# Patient Record
Sex: MALE | Race: WHITE | NOT HISPANIC OR LATINO | Employment: OTHER | URBAN - NONMETROPOLITAN AREA
[De-identification: names, ages, dates, MRNs, and addresses within clinical notes are randomized per-mention and may not be internally consistent; named-entity substitution may affect disease eponyms.]

---

## 2021-05-18 ENCOUNTER — OFFICE VISIT (OUTPATIENT)
Dept: FAMILY MEDICINE | Facility: CLINIC | Age: 86
End: 2021-05-18
Payer: MEDICARE

## 2021-05-18 VITALS
HEIGHT: 75 IN | OXYGEN SATURATION: 99 % | DIASTOLIC BLOOD PRESSURE: 80 MMHG | BODY MASS INDEX: 27.35 KG/M2 | SYSTOLIC BLOOD PRESSURE: 130 MMHG | HEART RATE: 60 BPM | RESPIRATION RATE: 18 BRPM | WEIGHT: 220 LBS | TEMPERATURE: 97 F

## 2021-05-18 DIAGNOSIS — E03.9 HYPOTHYROIDISM, UNSPECIFIED TYPE: Primary | ICD-10-CM

## 2021-05-18 DIAGNOSIS — I10 ESSENTIAL HYPERTENSION, BENIGN: ICD-10-CM

## 2021-05-18 DIAGNOSIS — E78.2 MIXED HYPERLIPIDEMIA: ICD-10-CM

## 2021-05-18 LAB
ALBUMIN SERPL BCP-MCNC: 3.9 G/DL (ref 3.5–5)
ALBUMIN/GLOB SERPL: 1.2 {RATIO}
ALP SERPL-CCNC: 70 U/L (ref 45–115)
ALT SERPL W P-5'-P-CCNC: 31 U/L (ref 16–61)
ANION GAP SERPL CALCULATED.3IONS-SCNC: 7 MMOL/L (ref 7–16)
AST SERPL W P-5'-P-CCNC: 20 U/L (ref 15–37)
BILIRUB SERPL-MCNC: 0.9 MG/DL (ref 0–1.2)
BUN SERPL-MCNC: 26 MG/DL (ref 7–18)
BUN/CREAT SERPL: 25 (ref 6–20)
CALCIUM SERPL-MCNC: 8.8 MG/DL (ref 8.5–10.1)
CHLORIDE SERPL-SCNC: 105 MMOL/L (ref 98–107)
CHOLEST SERPL-MCNC: 232 MG/DL (ref 0–200)
CHOLEST/HDLC SERPL: 5.2 {RATIO}
CO2 SERPL-SCNC: 30 MMOL/L (ref 21–32)
CREAT SERPL-MCNC: 1.03 MG/DL (ref 0.7–1.3)
GLOBULIN SER-MCNC: 3.3 G/DL (ref 2–4)
GLUCOSE SERPL-MCNC: 121 MG/DL (ref 74–106)
HDLC SERPL-MCNC: 45 MG/DL (ref 40–60)
LDLC SERPL CALC-MCNC: 166 MG/DL
LDLC/HDLC SERPL: 3.7 {RATIO}
NONHDLC SERPL-MCNC: 187 MG/DL
POTASSIUM SERPL-SCNC: 4.1 MMOL/L (ref 3.5–5.1)
PROT SERPL-MCNC: 7.2 G/DL (ref 6.4–8.2)
SODIUM SERPL-SCNC: 138 MMOL/L (ref 136–145)
TRIGL SERPL-MCNC: 105 MG/DL (ref 35–150)
TSH SERPL DL<=0.005 MIU/L-ACNC: 1.25 UIU/ML (ref 0.36–3.74)
VLDLC SERPL-MCNC: 21 MG/DL

## 2021-05-18 PROCEDURE — 80061 LIPID PANEL: CPT | Mod: ,,, | Performed by: CLINICAL MEDICAL LABORATORY

## 2021-05-18 PROCEDURE — 80061 LIPID PANEL: ICD-10-PCS | Mod: ,,, | Performed by: CLINICAL MEDICAL LABORATORY

## 2021-05-18 PROCEDURE — 80053 COMPREHEN METABOLIC PANEL: CPT | Mod: ,,, | Performed by: CLINICAL MEDICAL LABORATORY

## 2021-05-18 PROCEDURE — 84443 TSH: ICD-10-PCS | Mod: ,,, | Performed by: CLINICAL MEDICAL LABORATORY

## 2021-05-18 PROCEDURE — 99213 PR OFFICE/OUTPT VISIT, EST, LEVL III, 20-29 MIN: ICD-10-PCS | Mod: ,,, | Performed by: NURSE PRACTITIONER

## 2021-05-18 PROCEDURE — 80053 COMPREHENSIVE METABOLIC PANEL: ICD-10-PCS | Mod: ,,, | Performed by: CLINICAL MEDICAL LABORATORY

## 2021-05-18 PROCEDURE — 84443 ASSAY THYROID STIM HORMONE: CPT | Mod: ,,, | Performed by: CLINICAL MEDICAL LABORATORY

## 2021-05-18 PROCEDURE — 99213 OFFICE O/P EST LOW 20 MIN: CPT | Mod: ,,, | Performed by: NURSE PRACTITIONER

## 2021-05-18 RX ORDER — HYDROCHLOROTHIAZIDE 25 MG/1
25 TABLET ORAL DAILY
Qty: 90 TABLET | Refills: 1 | Status: SHIPPED | OUTPATIENT
Start: 2021-05-18 | End: 2021-11-30 | Stop reason: SDUPTHER

## 2021-05-18 RX ORDER — LEVOTHYROXINE SODIUM 50 UG/1
50 TABLET ORAL
Qty: 108 TABLET | Refills: 1 | Status: SHIPPED | OUTPATIENT
Start: 2021-05-18 | End: 2021-08-10 | Stop reason: SDUPTHER

## 2021-05-18 RX ORDER — LOSARTAN POTASSIUM 50 MG/1
50 TABLET ORAL DAILY
Qty: 90 TABLET | Refills: 1 | Status: SHIPPED | OUTPATIENT
Start: 2021-05-18 | End: 2021-11-30 | Stop reason: SDUPTHER

## 2021-05-18 RX ORDER — HYDROCHLOROTHIAZIDE 25 MG/1
25 TABLET ORAL DAILY
COMMUNITY
End: 2021-05-18 | Stop reason: SDUPTHER

## 2021-05-18 RX ORDER — ACETAMINOPHEN 500 MG
2000 TABLET ORAL DAILY
COMMUNITY

## 2021-05-18 RX ORDER — ASCORBIC ACID 500 MG
500 TABLET ORAL DAILY
COMMUNITY

## 2021-05-18 RX ORDER — LEVOTHYROXINE SODIUM 50 UG/1
50 TABLET ORAL
COMMUNITY
End: 2021-05-18 | Stop reason: SDUPTHER

## 2021-05-18 RX ORDER — LOSARTAN POTASSIUM 50 MG/1
50 TABLET ORAL DAILY
COMMUNITY
End: 2021-05-18

## 2021-05-18 RX ORDER — AMOXICILLIN 500 MG
1 CAPSULE ORAL DAILY
COMMUNITY

## 2021-05-18 RX ORDER — NITROGLYCERIN 0.4 MG/1
0.4 TABLET SUBLINGUAL EVERY 5 MIN PRN
COMMUNITY
End: 2022-12-28 | Stop reason: SDUPTHER

## 2021-08-10 DIAGNOSIS — E03.9 HYPOTHYROIDISM, UNSPECIFIED TYPE: ICD-10-CM

## 2021-08-10 RX ORDER — LEVOTHYROXINE SODIUM 50 UG/1
TABLET ORAL
Qty: 136 TABLET | Refills: 1 | Status: SHIPPED | OUTPATIENT
Start: 2021-08-10 | End: 2021-08-10 | Stop reason: SDUPTHER

## 2021-08-10 RX ORDER — LEVOTHYROXINE SODIUM 50 UG/1
TABLET ORAL
Qty: 136 TABLET | Refills: 1 | Status: SHIPPED | OUTPATIENT
Start: 2021-08-10 | End: 2021-11-30 | Stop reason: SDUPTHER

## 2021-11-30 ENCOUNTER — OFFICE VISIT (OUTPATIENT)
Dept: FAMILY MEDICINE | Facility: CLINIC | Age: 86
End: 2021-11-30
Payer: MEDICARE

## 2021-11-30 VITALS
DIASTOLIC BLOOD PRESSURE: 78 MMHG | HEART RATE: 72 BPM | HEIGHT: 75 IN | WEIGHT: 219.19 LBS | BODY MASS INDEX: 27.25 KG/M2 | OXYGEN SATURATION: 99 % | TEMPERATURE: 96 F | SYSTOLIC BLOOD PRESSURE: 120 MMHG | RESPIRATION RATE: 18 BRPM

## 2021-11-30 DIAGNOSIS — I10 ESSENTIAL HYPERTENSION, BENIGN: Primary | ICD-10-CM

## 2021-11-30 DIAGNOSIS — E03.9 HYPOTHYROIDISM, UNSPECIFIED TYPE: ICD-10-CM

## 2021-11-30 LAB
ALBUMIN SERPL BCP-MCNC: 4.1 G/DL (ref 3.5–5)
ALBUMIN/GLOB SERPL: 1.1 {RATIO}
ALP SERPL-CCNC: 89 U/L (ref 45–115)
ALT SERPL W P-5'-P-CCNC: 43 U/L (ref 16–61)
ANION GAP SERPL CALCULATED.3IONS-SCNC: 12 MMOL/L (ref 7–16)
AST SERPL W P-5'-P-CCNC: 24 U/L (ref 15–37)
BASOPHILS # BLD AUTO: 0.06 K/UL (ref 0–0.2)
BASOPHILS NFR BLD AUTO: 0.9 % (ref 0–1)
BILIRUB SERPL-MCNC: 0.8 MG/DL (ref 0–1.2)
BUN SERPL-MCNC: 27 MG/DL (ref 7–18)
BUN/CREAT SERPL: 23 (ref 6–20)
CALCIUM SERPL-MCNC: 9.4 MG/DL (ref 8.5–10.1)
CHLORIDE SERPL-SCNC: 101 MMOL/L (ref 98–107)
CHOLEST SERPL-MCNC: 250 MG/DL (ref 0–200)
CHOLEST/HDLC SERPL: 5.6 {RATIO}
CO2 SERPL-SCNC: 29 MMOL/L (ref 21–32)
CREAT SERPL-MCNC: 1.15 MG/DL (ref 0.7–1.3)
DIFFERENTIAL METHOD BLD: ABNORMAL
EOSINOPHIL # BLD AUTO: 0.22 K/UL (ref 0–0.5)
EOSINOPHIL NFR BLD AUTO: 3.3 % (ref 1–4)
ERYTHROCYTE [DISTWIDTH] IN BLOOD BY AUTOMATED COUNT: 12.6 % (ref 11.5–14.5)
GLOBULIN SER-MCNC: 3.9 G/DL (ref 2–4)
GLUCOSE SERPL-MCNC: 134 MG/DL (ref 74–106)
HCT VFR BLD AUTO: 48.1 % (ref 40–54)
HDLC SERPL-MCNC: 45 MG/DL (ref 40–60)
HGB BLD-MCNC: 16.5 G/DL (ref 13.5–18)
IMM GRANULOCYTES # BLD AUTO: 0.05 K/UL (ref 0–0.04)
IMM GRANULOCYTES NFR BLD: 0.7 % (ref 0–0.4)
LDLC SERPL CALC-MCNC: 177 MG/DL
LDLC/HDLC SERPL: 3.9 {RATIO}
LYMPHOCYTES # BLD AUTO: 1.73 K/UL (ref 1–4.8)
LYMPHOCYTES NFR BLD AUTO: 25.9 % (ref 27–41)
MCH RBC QN AUTO: 31.5 PG (ref 27–31)
MCHC RBC AUTO-ENTMCNC: 34.3 G/DL (ref 32–36)
MCV RBC AUTO: 91.8 FL (ref 80–96)
MONOCYTES # BLD AUTO: 0.46 K/UL (ref 0–0.8)
MONOCYTES NFR BLD AUTO: 6.9 % (ref 2–6)
MPC BLD CALC-MCNC: 10.4 FL (ref 9.4–12.4)
NEUTROPHILS # BLD AUTO: 4.17 K/UL (ref 1.8–7.7)
NEUTROPHILS NFR BLD AUTO: 62.3 % (ref 53–65)
NONHDLC SERPL-MCNC: 205 MG/DL
NRBC # BLD AUTO: 0 X10E3/UL
NRBC, AUTO (.00): 0 %
PLATELET # BLD AUTO: 167 K/UL (ref 150–400)
POTASSIUM SERPL-SCNC: 4.9 MMOL/L (ref 3.5–5.1)
PROT SERPL-MCNC: 8 G/DL (ref 6.4–8.2)
RBC # BLD AUTO: 5.24 M/UL (ref 4.6–6.2)
SODIUM SERPL-SCNC: 137 MMOL/L (ref 136–145)
TRIGL SERPL-MCNC: 142 MG/DL (ref 35–150)
TSH SERPL DL<=0.005 MIU/L-ACNC: 2.18 UIU/ML (ref 0.36–3.74)
VLDLC SERPL-MCNC: 28 MG/DL
WBC # BLD AUTO: 6.69 K/UL (ref 4.5–11)

## 2021-11-30 PROCEDURE — 80053 COMPREHENSIVE METABOLIC PANEL: ICD-10-PCS | Mod: ,,, | Performed by: CLINICAL MEDICAL LABORATORY

## 2021-11-30 PROCEDURE — 80053 COMPREHEN METABOLIC PANEL: CPT | Mod: ,,, | Performed by: CLINICAL MEDICAL LABORATORY

## 2021-11-30 PROCEDURE — 99213 OFFICE O/P EST LOW 20 MIN: CPT | Mod: ,,, | Performed by: NURSE PRACTITIONER

## 2021-11-30 PROCEDURE — 99213 PR OFFICE/OUTPT VISIT, EST, LEVL III, 20-29 MIN: ICD-10-PCS | Mod: ,,, | Performed by: NURSE PRACTITIONER

## 2021-11-30 PROCEDURE — 85025 COMPLETE CBC W/AUTO DIFF WBC: CPT | Mod: ,,, | Performed by: CLINICAL MEDICAL LABORATORY

## 2021-11-30 PROCEDURE — 85025 CBC WITH DIFFERENTIAL: ICD-10-PCS | Mod: ,,, | Performed by: CLINICAL MEDICAL LABORATORY

## 2021-11-30 PROCEDURE — 84443 TSH: ICD-10-PCS | Mod: ,,, | Performed by: CLINICAL MEDICAL LABORATORY

## 2021-11-30 PROCEDURE — 84443 ASSAY THYROID STIM HORMONE: CPT | Mod: ,,, | Performed by: CLINICAL MEDICAL LABORATORY

## 2021-11-30 PROCEDURE — 80061 LIPID PANEL: CPT | Mod: ,,, | Performed by: CLINICAL MEDICAL LABORATORY

## 2021-11-30 PROCEDURE — 80061 LIPID PANEL: ICD-10-PCS | Mod: ,,, | Performed by: CLINICAL MEDICAL LABORATORY

## 2021-11-30 RX ORDER — HYDROCHLOROTHIAZIDE 25 MG/1
25 TABLET ORAL DAILY
Qty: 90 TABLET | Refills: 1 | Status: SHIPPED | OUTPATIENT
Start: 2021-11-30 | End: 2022-06-13 | Stop reason: SDUPTHER

## 2021-11-30 RX ORDER — LOSARTAN POTASSIUM 50 MG/1
50 TABLET ORAL DAILY
Qty: 90 TABLET | Refills: 1 | Status: SHIPPED | OUTPATIENT
Start: 2021-11-30 | End: 2022-06-13 | Stop reason: SDUPTHER

## 2021-11-30 RX ORDER — LEVOTHYROXINE SODIUM 50 UG/1
TABLET ORAL
Qty: 136 TABLET | Refills: 1 | Status: SHIPPED | OUTPATIENT
Start: 2021-11-30 | End: 2022-06-13 | Stop reason: SDUPTHER

## 2021-11-30 RX ORDER — ALBUTEROL SULFATE 90 UG/1
2 AEROSOL, METERED RESPIRATORY (INHALATION) EVERY 6 HOURS PRN
Qty: 8 G | Refills: 1 | Status: SHIPPED | OUTPATIENT
Start: 2021-11-30 | End: 2022-06-13

## 2021-11-30 RX ORDER — HYDROCHLOROTHIAZIDE 25 MG/1
25 TABLET ORAL DAILY
Qty: 90 TABLET | Refills: 1 | Status: SHIPPED | OUTPATIENT
Start: 2021-11-30 | End: 2021-11-30 | Stop reason: SDUPTHER

## 2021-11-30 RX ORDER — LEVOTHYROXINE SODIUM 50 UG/1
TABLET ORAL
Qty: 136 TABLET | Refills: 1 | Status: SHIPPED | OUTPATIENT
Start: 2021-11-30 | End: 2021-11-30 | Stop reason: SDUPTHER

## 2021-11-30 RX ORDER — LOSARTAN POTASSIUM 50 MG/1
50 TABLET ORAL DAILY
Qty: 90 TABLET | Refills: 1 | Status: SHIPPED | OUTPATIENT
Start: 2021-11-30 | End: 2021-11-30 | Stop reason: SDUPTHER

## 2021-12-01 DIAGNOSIS — R73.9 HYPERGLYCEMIA: Primary | ICD-10-CM

## 2022-01-02 DIAGNOSIS — E03.9 HYPOTHYROIDISM, UNSPECIFIED TYPE: ICD-10-CM

## 2022-01-03 RX ORDER — LEVOTHYROXINE SODIUM 50 UG/1
TABLET ORAL
Qty: 114 TABLET | OUTPATIENT
Start: 2022-01-03

## 2022-03-11 DIAGNOSIS — Z71.89 COMPLEX CARE COORDINATION: ICD-10-CM

## 2022-04-01 DIAGNOSIS — E03.9 HYPOTHYROIDISM, UNSPECIFIED TYPE: ICD-10-CM

## 2022-04-01 RX ORDER — LEVOTHYROXINE SODIUM 50 UG/1
TABLET ORAL
Qty: 30 TABLET | Refills: 0 | OUTPATIENT
Start: 2022-04-01

## 2022-06-13 ENCOUNTER — OFFICE VISIT (OUTPATIENT)
Dept: FAMILY MEDICINE | Facility: CLINIC | Age: 87
End: 2022-06-13
Payer: MEDICARE

## 2022-06-13 VITALS
WEIGHT: 214.63 LBS | RESPIRATION RATE: 18 BRPM | DIASTOLIC BLOOD PRESSURE: 66 MMHG | TEMPERATURE: 98 F | OXYGEN SATURATION: 96 % | HEIGHT: 75 IN | SYSTOLIC BLOOD PRESSURE: 110 MMHG | HEART RATE: 67 BPM | BODY MASS INDEX: 26.69 KG/M2

## 2022-06-13 DIAGNOSIS — I10 ESSENTIAL HYPERTENSION, BENIGN: ICD-10-CM

## 2022-06-13 DIAGNOSIS — E03.9 HYPOTHYROIDISM, UNSPECIFIED TYPE: Primary | ICD-10-CM

## 2022-06-13 DIAGNOSIS — E78.2 MIXED HYPERLIPIDEMIA: ICD-10-CM

## 2022-06-13 LAB
ALBUMIN SERPL BCP-MCNC: 4.2 G/DL (ref 3.5–5)
ALBUMIN/GLOB SERPL: 1.4 {RATIO}
ALP SERPL-CCNC: 90 U/L (ref 45–115)
ALT SERPL W P-5'-P-CCNC: 40 U/L (ref 16–61)
ANION GAP SERPL CALCULATED.3IONS-SCNC: 9 MMOL/L (ref 7–16)
AST SERPL W P-5'-P-CCNC: 21 U/L (ref 15–37)
BASOPHILS # BLD AUTO: 0.06 K/UL (ref 0–0.2)
BASOPHILS NFR BLD AUTO: 1 % (ref 0–1)
BILIRUB SERPL-MCNC: 1 MG/DL (ref 0–1.2)
BUN SERPL-MCNC: 27 MG/DL (ref 7–18)
BUN/CREAT SERPL: 25 (ref 6–20)
CALCIUM SERPL-MCNC: 9.3 MG/DL (ref 8.5–10.1)
CHLORIDE SERPL-SCNC: 102 MMOL/L (ref 98–107)
CHOLEST SERPL-MCNC: 249 MG/DL (ref 0–200)
CHOLEST/HDLC SERPL: 6.1 {RATIO}
CO2 SERPL-SCNC: 30 MMOL/L (ref 21–32)
CREAT SERPL-MCNC: 1.08 MG/DL (ref 0.7–1.3)
DIFFERENTIAL METHOD BLD: ABNORMAL
EOSINOPHIL # BLD AUTO: 0.11 K/UL (ref 0–0.5)
EOSINOPHIL NFR BLD AUTO: 1.9 % (ref 1–4)
ERYTHROCYTE [DISTWIDTH] IN BLOOD BY AUTOMATED COUNT: 12.9 % (ref 11.5–14.5)
GLOBULIN SER-MCNC: 2.9 G/DL (ref 2–4)
GLUCOSE SERPL-MCNC: 135 MG/DL (ref 74–106)
HCT VFR BLD AUTO: 48.3 % (ref 40–54)
HDLC SERPL-MCNC: 41 MG/DL (ref 40–60)
HGB BLD-MCNC: 16.5 G/DL (ref 13.5–18)
IMM GRANULOCYTES # BLD AUTO: 0.03 K/UL (ref 0–0.04)
IMM GRANULOCYTES NFR BLD: 0.5 % (ref 0–0.4)
LDLC SERPL CALC-MCNC: 174 MG/DL
LDLC/HDLC SERPL: 4.2 {RATIO}
LYMPHOCYTES # BLD AUTO: 1.35 K/UL (ref 1–4.8)
LYMPHOCYTES NFR BLD AUTO: 23.2 % (ref 27–41)
MCH RBC QN AUTO: 31.7 PG (ref 27–31)
MCHC RBC AUTO-ENTMCNC: 34.2 G/DL (ref 32–36)
MCV RBC AUTO: 92.7 FL (ref 80–96)
MONOCYTES # BLD AUTO: 0.44 K/UL (ref 0–0.8)
MONOCYTES NFR BLD AUTO: 7.6 % (ref 2–6)
MPC BLD CALC-MCNC: 10.2 FL (ref 9.4–12.4)
NEUTROPHILS # BLD AUTO: 3.82 K/UL (ref 1.8–7.7)
NEUTROPHILS NFR BLD AUTO: 65.8 % (ref 53–65)
NONHDLC SERPL-MCNC: 208 MG/DL
NRBC # BLD AUTO: 0 X10E3/UL
NRBC, AUTO (.00): 0 %
PLATELET # BLD AUTO: 155 K/UL (ref 150–400)
POTASSIUM SERPL-SCNC: 4.3 MMOL/L (ref 3.5–5.1)
PROT SERPL-MCNC: 7.1 G/DL (ref 6.4–8.2)
RBC # BLD AUTO: 5.21 M/UL (ref 4.6–6.2)
SODIUM SERPL-SCNC: 137 MMOL/L (ref 136–145)
TRIGL SERPL-MCNC: 170 MG/DL (ref 35–150)
TSH SERPL DL<=0.005 MIU/L-ACNC: 1.84 UIU/ML (ref 0.36–3.74)
VLDLC SERPL-MCNC: 34 MG/DL
WBC # BLD AUTO: 5.81 K/UL (ref 4.5–11)

## 2022-06-13 PROCEDURE — 80061 LIPID PANEL: ICD-10-PCS | Mod: ,,, | Performed by: CLINICAL MEDICAL LABORATORY

## 2022-06-13 PROCEDURE — 80061 LIPID PANEL: CPT | Mod: ,,, | Performed by: CLINICAL MEDICAL LABORATORY

## 2022-06-13 PROCEDURE — 80053 COMPREHENSIVE METABOLIC PANEL: ICD-10-PCS | Mod: ,,, | Performed by: CLINICAL MEDICAL LABORATORY

## 2022-06-13 PROCEDURE — 99213 OFFICE O/P EST LOW 20 MIN: CPT | Mod: ,,, | Performed by: NURSE PRACTITIONER

## 2022-06-13 PROCEDURE — 85025 CBC WITH DIFFERENTIAL: ICD-10-PCS | Mod: ,,, | Performed by: CLINICAL MEDICAL LABORATORY

## 2022-06-13 PROCEDURE — 84443 ASSAY THYROID STIM HORMONE: CPT | Mod: ,,, | Performed by: CLINICAL MEDICAL LABORATORY

## 2022-06-13 PROCEDURE — 85025 COMPLETE CBC W/AUTO DIFF WBC: CPT | Mod: ,,, | Performed by: CLINICAL MEDICAL LABORATORY

## 2022-06-13 PROCEDURE — 99213 PR OFFICE/OUTPT VISIT, EST, LEVL III, 20-29 MIN: ICD-10-PCS | Mod: ,,, | Performed by: NURSE PRACTITIONER

## 2022-06-13 PROCEDURE — 80053 COMPREHEN METABOLIC PANEL: CPT | Mod: ,,, | Performed by: CLINICAL MEDICAL LABORATORY

## 2022-06-13 PROCEDURE — 84443 TSH: ICD-10-PCS | Mod: ,,, | Performed by: CLINICAL MEDICAL LABORATORY

## 2022-06-13 RX ORDER — LEVOTHYROXINE SODIUM 50 UG/1
TABLET ORAL
Qty: 136 TABLET | Refills: 1 | Status: SHIPPED | OUTPATIENT
Start: 2022-06-13 | End: 2022-12-28 | Stop reason: SDUPTHER

## 2022-06-13 RX ORDER — NITROGLYCERIN 0.4 MG/1
0.4 TABLET SUBLINGUAL EVERY 5 MIN PRN
Status: CANCELLED | OUTPATIENT
Start: 2022-06-13

## 2022-06-13 RX ORDER — LOSARTAN POTASSIUM 50 MG/1
50 TABLET ORAL DAILY
Qty: 90 TABLET | Refills: 1 | Status: SHIPPED | OUTPATIENT
Start: 2022-06-13 | End: 2022-12-28 | Stop reason: SDUPTHER

## 2022-06-13 RX ORDER — HYDROCHLOROTHIAZIDE 25 MG/1
25 TABLET ORAL DAILY
Qty: 90 TABLET | Refills: 1 | Status: SHIPPED | OUTPATIENT
Start: 2022-06-13 | End: 2022-12-28 | Stop reason: SDUPTHER

## 2022-06-13 NOTE — PROGRESS NOTES
BLAYNE Olvera   [facility]  [address]  [place] [address]     PATIENT NAME: Jan Stewart  : 1935  DATE: 22  MRN: [id number]      Billing Provider: BLAYNE Olvera  Level of Service: CT OFFICE/OUTPT VISIT, EST, LEVL III, 20-29 MIN  Patient PCP Information     Provider PCP Type    BLAYNE Olvera General          Reason for Visit / Chief Complaint: Hypertension (Here for check up and med refills.) and Hypothyroidism       Update PCP  Update Chief Complaint         History of Present Illness / Problem Focused Workflow     aJn Stewart presents to the clinic for follow up on chronic conditions and check fasting labs  Pt takes fish oil for hyperlipidemia along with LFLC diet.      Review of Systems     Review of Systems   Constitutional: Negative.  Negative for activity change, appetite change and unexpected weight change.   Eyes: Negative for visual disturbance.   Respiratory: Negative for cough, chest tightness and shortness of breath.    Cardiovascular: Negative for chest pain, palpitations and leg swelling.   Gastrointestinal: Negative for abdominal pain, change in bowel habit, nausea, vomiting and change in bowel habit.   Endocrine: Negative for cold intolerance, heat intolerance, polydipsia, polyphagia and polyuria.   Neurological: Negative for weakness and headaches.        Medical / Social / Family History     Past Medical History:   Diagnosis Date    Adult osteomalacia     Hyperlipidemia     Myalgia     Vitamin D deficiency        History reviewed. No pertinent surgical history.    Social History    reports that he has never smoked. He has never used smokeless tobacco. He reports that he does not drink alcohol and does not use drugs.    Family History  's family history includes Cancer in his brother; Diabetes in his mother; Heart disease in his mother.    Medications and Allergies     Medications  Outpatient Medications Marked as Taking for the  "6/13/22 encounter (Office Visit) with BLAYNE Sosa   Medication Sig Dispense Refill    ascorbic acid, vitamin C, (VITAMIN C) 500 MG tablet Take 500 mg by mouth once daily.      cholecalciferol, vitamin D3, (VITAMIN D3) 50 mcg (2,000 unit) Cap Take 2,000 Units by mouth once daily.      omega-3 fatty acids/fish oil (FISH OIL-OMEGA-3 FATTY ACIDS) 300-1,000 mg capsule Take 1 capsule by mouth once daily.      [DISCONTINUED] hydroCHLOROthiazide (HYDRODIURIL) 25 MG tablet Take 1 tablet (25 mg total) by mouth once daily. 90 tablet 1    [DISCONTINUED] levothyroxine (SYNTHROID) 50 MCG tablet Take 2 Tablets Monday andWed and  Take 1 Tablet Every Other Day. 136 tablet 1    [DISCONTINUED] losartan (COZAAR) 50 MG tablet Take 1 tablet (50 mg total) by mouth once daily. 90 tablet 1       Allergies  Review of patient's allergies indicates:  No Known Allergies    Physical Examination     Vitals:    06/13/22 0830   BP: 110/66   BP Location: Left arm   Patient Position: Sitting   BP Method: Large (Manual)   Pulse: 67   Resp: 18   Temp: 97.8 °F (36.6 °C)   TempSrc: Oral   SpO2: 96%   Weight: 97.3 kg (214 lb 9.6 oz)   Height: 6' 3" (1.905 m)      Physical Exam  Constitutional:       General: He is not in acute distress.     Appearance: Normal appearance.   Neck:      Thyroid: No thyromegaly.      Vascular: No carotid bruit.   Cardiovascular:      Rate and Rhythm: Normal rate and regular rhythm.      Pulses:           Carotid pulses are 3+ on the right side and 3+ on the left side.       Posterior tibial pulses are 2+ on the right side and 2+ on the left side.      Heart sounds: Murmur heard.    Systolic murmur is present with a grade of 2/6.  Pulmonary:      Effort: Pulmonary effort is normal. No accessory muscle usage or respiratory distress.      Breath sounds: Normal breath sounds. No wheezing, rhonchi or rales.   Abdominal:      General: Bowel sounds are normal.      Palpations: Abdomen is soft.   Musculoskeletal:    "      General: Normal range of motion.      Cervical back: Neck supple.      Right lower leg: No edema.      Left lower leg: No edema.   Skin:     General: Skin is warm and dry.      Coloration: Skin is not jaundiced or pale.   Neurological:      Mental Status: He is alert and oriented to person, place, and time.      Cranial Nerves: Cranial nerves are intact.      Gait: Gait is intact.          Assessment and Plan (including Health Maintenance)      Problem List  Smart Sets  Document Outside HM   :  CMP  Sodium   Date Value Ref Range Status   11/30/2021 137 136 - 145 mmol/L Final     Potassium   Date Value Ref Range Status   11/30/2021 4.9 3.5 - 5.1 mmol/L Final     Chloride   Date Value Ref Range Status   11/30/2021 101 98 - 107 mmol/L Final     CO2   Date Value Ref Range Status   11/30/2021 29 21 - 32 mmol/L Final     Glucose   Date Value Ref Range Status   11/30/2021 134 (H) 74 - 106 mg/dL Final     BUN   Date Value Ref Range Status   11/30/2021 27 (H) 7 - 18 mg/dL Final     Creatinine   Date Value Ref Range Status   11/30/2021 1.15 0.70 - 1.30 mg/dL Final     Calcium   Date Value Ref Range Status   11/30/2021 9.4 8.5 - 10.1 mg/dL Final     Total Protein   Date Value Ref Range Status   11/30/2021 8.0 6.4 - 8.2 g/dL Final     Albumin   Date Value Ref Range Status   11/30/2021 4.1 3.5 - 5.0 g/dL Final     Bilirubin, Total   Date Value Ref Range Status   11/30/2021 0.8 0.0 - 1.2 mg/dL Final     Alk Phos   Date Value Ref Range Status   11/30/2021 89 45 - 115 U/L Final     AST   Date Value Ref Range Status   11/30/2021 24 15 - 37 U/L Final     ALT   Date Value Ref Range Status   11/30/2021 43 16 - 61 U/L Final     Anion Gap   Date Value Ref Range Status   11/30/2021 12 7 - 16 mmol/L Final     eGFR   Date Value Ref Range Status   11/30/2021 64 >=60 mL/min/1.73m² Final     Lab Results   Component Value Date    CHOL 250 (H) 11/30/2021    CHOL 232 (H) 05/18/2021     Lab Results   Component Value Date    HDL 45 11/30/2021     HDL 45 05/18/2021     Lab Results   Component Value Date    LDLCALC 177 11/30/2021    LDLCALC 166 05/18/2021     Lab Results   Component Value Date    TRIG 142 11/30/2021    TRIG 105 05/18/2021     Lab Results   Component Value Date    CHOLHDL 5.6 11/30/2021    CHOLHDL 5.2 05/18/2021             Health Maintenance Due   Topic Date Due    TETANUS VACCINE  Never done    Pneumococcal Vaccines (Age 65+) (2 - PCV) 10/10/2014       Problem List Items Addressed This Visit        Cardiac/Vascular    Essential hypertension, benign    Relevant Medications    hydroCHLOROthiazide (HYDRODIURIL) 25 MG tablet    losartan (COZAAR) 50 MG tablet    Other Relevant Orders    CBC Auto Differential    Lipid Panel    Comprehensive Metabolic Panel    Hyperlipidemia    Relevant Orders    Lipid Panel       Endocrine    Hypothyroidism - Primary    Relevant Medications    levothyroxine (SYNTHROID) 50 MCG tablet    Other Relevant Orders    TSH        Hypothyroidism, unspecified type  Comments:  Will check TSH and continue current medication.  Orders:  -     TSH; Future; Expected date: 06/13/2022  -     levothyroxine (SYNTHROID) 50 MCG tablet; Take 2 Tablets Monday andWed and  Take 1 Tablet Every Other Day.  Dispense: 136 tablet; Refill: 1    Essential hypertension, benign  Comments:  Will check CBC, and CMP and continue current medication and diet.  Orders:  -     CBC Auto Differential; Future; Expected date: 06/13/2022  -     Lipid Panel; Future; Expected date: 06/13/2022  -     Comprehensive Metabolic Panel; Future; Expected date: 06/13/2022  -     hydroCHLOROthiazide (HYDRODIURIL) 25 MG tablet; Take 1 tablet (25 mg total) by mouth once daily.  Dispense: 90 tablet; Refill: 1  -     losartan (COZAAR) 50 MG tablet; Take 1 tablet (50 mg total) by mouth once daily.  Dispense: 90 tablet; Refill: 1    Mixed hyperlipidemia  Comments:  Will check fasting lipids and continue LFLC diet, exercise and OTC medication. LDL goal < 70  Orders:  -      Lipid Panel; Future; Expected date: 06/13/2022       Health Maintenance Topics with due status: Not Due       Topic Last Completion Date    Influenza Vaccine 09/06/2017    Lipid Panel 11/30/2021       Procedures     Future Appointments   Date Time Provider Department Center   9/13/2022  8:00 AM [facility] NURSE [facility] GUILLERMO Srinivasan        Follow up in about 6 months (around 12/13/2022).     Signature:  BLAYNE Olvera    Date of encounter: 6/13/22

## 2022-09-28 DIAGNOSIS — I10 ESSENTIAL HYPERTENSION, BENIGN: ICD-10-CM

## 2022-09-28 DIAGNOSIS — E03.9 HYPOTHYROIDISM, UNSPECIFIED TYPE: ICD-10-CM

## 2022-09-29 RX ORDER — LOSARTAN POTASSIUM 50 MG/1
TABLET ORAL
Qty: 90 TABLET | Refills: 1 | OUTPATIENT
Start: 2022-09-29

## 2022-09-29 RX ORDER — HYDROCHLOROTHIAZIDE 25 MG/1
TABLET ORAL
Qty: 90 TABLET | Refills: 1 | OUTPATIENT
Start: 2022-09-29

## 2022-09-29 RX ORDER — LEVOTHYROXINE SODIUM 50 UG/1
TABLET ORAL
Qty: 136 TABLET | Refills: 1 | OUTPATIENT
Start: 2022-09-29

## 2022-10-09 DIAGNOSIS — Z71.89 COMPLEX CARE COORDINATION: ICD-10-CM

## 2022-12-28 ENCOUNTER — OFFICE VISIT (OUTPATIENT)
Dept: FAMILY MEDICINE | Facility: CLINIC | Age: 87
End: 2022-12-28
Payer: MEDICARE

## 2022-12-28 VITALS
TEMPERATURE: 98 F | RESPIRATION RATE: 18 BRPM | SYSTOLIC BLOOD PRESSURE: 132 MMHG | WEIGHT: 222 LBS | HEART RATE: 80 BPM | OXYGEN SATURATION: 98 % | DIASTOLIC BLOOD PRESSURE: 78 MMHG | HEIGHT: 75 IN | BODY MASS INDEX: 27.6 KG/M2

## 2022-12-28 DIAGNOSIS — I10 ESSENTIAL HYPERTENSION, BENIGN: Primary | ICD-10-CM

## 2022-12-28 DIAGNOSIS — E03.9 HYPOTHYROIDISM, UNSPECIFIED TYPE: ICD-10-CM

## 2022-12-28 DIAGNOSIS — R53.83 FATIGUE, UNSPECIFIED TYPE: ICD-10-CM

## 2022-12-28 DIAGNOSIS — R79.9 ABNORMAL FINDING OF BLOOD CHEMISTRY, UNSPECIFIED: ICD-10-CM

## 2022-12-28 LAB
ALBUMIN SERPL BCP-MCNC: 4.2 G/DL (ref 3.5–5)
ALBUMIN/GLOB SERPL: 1.2 {RATIO}
ALP SERPL-CCNC: 75 U/L (ref 45–115)
ALT SERPL W P-5'-P-CCNC: 35 U/L (ref 16–61)
ANION GAP SERPL CALCULATED.3IONS-SCNC: 12 MMOL/L (ref 7–16)
AST SERPL W P-5'-P-CCNC: 21 U/L (ref 15–37)
BASOPHILS # BLD AUTO: 0.07 K/UL (ref 0–0.2)
BASOPHILS NFR BLD AUTO: 1.1 % (ref 0–1)
BILIRUB SERPL-MCNC: 1 MG/DL (ref ?–1.2)
BUN SERPL-MCNC: 27 MG/DL (ref 7–18)
BUN/CREAT SERPL: 22 (ref 6–20)
CALCIUM SERPL-MCNC: 9.8 MG/DL (ref 8.5–10.1)
CHLORIDE SERPL-SCNC: 101 MMOL/L (ref 98–107)
CHOLEST SERPL-MCNC: 237 MG/DL (ref 0–200)
CHOLEST/HDLC SERPL: 5.6 {RATIO}
CO2 SERPL-SCNC: 29 MMOL/L (ref 21–32)
CREAT SERPL-MCNC: 1.23 MG/DL (ref 0.7–1.3)
DIFFERENTIAL METHOD BLD: ABNORMAL
EGFR (NO RACE VARIABLE) (RUSH/TITUS): 57 ML/MIN/1.73M²
EOSINOPHIL # BLD AUTO: 0.19 K/UL (ref 0–0.5)
EOSINOPHIL NFR BLD AUTO: 3 % (ref 1–4)
ERYTHROCYTE [DISTWIDTH] IN BLOOD BY AUTOMATED COUNT: 13.2 % (ref 11.5–14.5)
FOLATE SERPL-MCNC: 13.6 NG/ML (ref 3.1–17.5)
GLOBULIN SER-MCNC: 3.5 G/DL (ref 2–4)
GLUCOSE SERPL-MCNC: 131 MG/DL (ref 74–106)
HCT VFR BLD AUTO: 51.1 % (ref 40–54)
HDLC SERPL-MCNC: 42 MG/DL (ref 40–60)
HGB BLD-MCNC: 16.8 G/DL (ref 13.5–18)
IMM GRANULOCYTES # BLD AUTO: 0.04 K/UL (ref 0–0.04)
IMM GRANULOCYTES NFR BLD: 0.6 % (ref 0–0.4)
IRON SATN MFR SERPL: 49 % (ref 14–50)
IRON SERPL-MCNC: 146 ΜG/DL (ref 65–175)
LDLC SERPL CALC-MCNC: 160 MG/DL
LDLC/HDLC SERPL: 3.8 {RATIO}
LYMPHOCYTES # BLD AUTO: 1.79 K/UL (ref 1–4.8)
LYMPHOCYTES NFR BLD AUTO: 27.8 % (ref 27–41)
MCH RBC QN AUTO: 31.2 PG (ref 27–31)
MCHC RBC AUTO-ENTMCNC: 32.9 G/DL (ref 32–36)
MCV RBC AUTO: 94.8 FL (ref 80–96)
MONOCYTES # BLD AUTO: 0.5 K/UL (ref 0–0.8)
MONOCYTES NFR BLD AUTO: 7.8 % (ref 2–6)
MPC BLD CALC-MCNC: 10 FL (ref 9.4–12.4)
NEUTROPHILS # BLD AUTO: 3.85 K/UL (ref 1.8–7.7)
NEUTROPHILS NFR BLD AUTO: 59.7 % (ref 53–65)
NONHDLC SERPL-MCNC: 195 MG/DL
NRBC # BLD AUTO: 0 X10E3/UL
NRBC, AUTO (.00): 0 %
PLATELET # BLD AUTO: 185 K/UL (ref 150–400)
POTASSIUM SERPL-SCNC: 4.5 MMOL/L (ref 3.5–5.1)
PROT SERPL-MCNC: 7.7 G/DL (ref 6.4–8.2)
RBC # BLD AUTO: 5.39 M/UL (ref 4.6–6.2)
SODIUM SERPL-SCNC: 137 MMOL/L (ref 136–145)
TIBC SERPL-MCNC: 298 ΜG/DL (ref 250–450)
TRIGL SERPL-MCNC: 173 MG/DL (ref 35–150)
TSH SERPL DL<=0.005 MIU/L-ACNC: 2.35 UIU/ML (ref 0.36–3.74)
VIT B12 SERPL-MCNC: 588 PG/ML (ref 193–986)
VLDLC SERPL-MCNC: 35 MG/DL
WBC # BLD AUTO: 6.44 K/UL (ref 4.5–11)

## 2022-12-28 PROCEDURE — 99214 PR OFFICE/OUTPT VISIT, EST, LEVL IV, 30-39 MIN: ICD-10-PCS | Mod: ,,, | Performed by: NURSE PRACTITIONER

## 2022-12-28 PROCEDURE — 83540 IRON AND TIBC: ICD-10-PCS | Mod: ,,, | Performed by: CLINICAL MEDICAL LABORATORY

## 2022-12-28 PROCEDURE — 82607 VITAMIN B12/FOLATE, SERUM PANEL: ICD-10-PCS | Mod: GZ,,, | Performed by: CLINICAL MEDICAL LABORATORY

## 2022-12-28 PROCEDURE — 84443 TSH: ICD-10-PCS | Mod: ,,, | Performed by: CLINICAL MEDICAL LABORATORY

## 2022-12-28 PROCEDURE — 82746 ASSAY OF FOLIC ACID SERUM: CPT | Mod: GZ,,, | Performed by: CLINICAL MEDICAL LABORATORY

## 2022-12-28 PROCEDURE — 83540 ASSAY OF IRON: CPT | Mod: ,,, | Performed by: CLINICAL MEDICAL LABORATORY

## 2022-12-28 PROCEDURE — 85025 CBC WITH DIFFERENTIAL: ICD-10-PCS | Mod: ,,, | Performed by: CLINICAL MEDICAL LABORATORY

## 2022-12-28 PROCEDURE — 80061 LIPID PANEL: CPT | Mod: ,,, | Performed by: CLINICAL MEDICAL LABORATORY

## 2022-12-28 PROCEDURE — 80061 LIPID PANEL: ICD-10-PCS | Mod: ,,, | Performed by: CLINICAL MEDICAL LABORATORY

## 2022-12-28 PROCEDURE — 80053 COMPREHENSIVE METABOLIC PANEL: ICD-10-PCS | Mod: ,,, | Performed by: CLINICAL MEDICAL LABORATORY

## 2022-12-28 PROCEDURE — 99214 OFFICE O/P EST MOD 30 MIN: CPT | Mod: ,,, | Performed by: NURSE PRACTITIONER

## 2022-12-28 PROCEDURE — 83550 IRON BINDING TEST: CPT | Mod: ,,, | Performed by: CLINICAL MEDICAL LABORATORY

## 2022-12-28 PROCEDURE — 84443 ASSAY THYROID STIM HORMONE: CPT | Mod: ,,, | Performed by: CLINICAL MEDICAL LABORATORY

## 2022-12-28 PROCEDURE — 82746 VITAMIN B12/FOLATE, SERUM PANEL: ICD-10-PCS | Mod: GZ,,, | Performed by: CLINICAL MEDICAL LABORATORY

## 2022-12-28 PROCEDURE — 85025 COMPLETE CBC W/AUTO DIFF WBC: CPT | Mod: ,,, | Performed by: CLINICAL MEDICAL LABORATORY

## 2022-12-28 PROCEDURE — 83550 IRON AND TIBC: ICD-10-PCS | Mod: ,,, | Performed by: CLINICAL MEDICAL LABORATORY

## 2022-12-28 PROCEDURE — 80053 COMPREHEN METABOLIC PANEL: CPT | Mod: ,,, | Performed by: CLINICAL MEDICAL LABORATORY

## 2022-12-28 PROCEDURE — 82607 VITAMIN B-12: CPT | Mod: GZ,,, | Performed by: CLINICAL MEDICAL LABORATORY

## 2022-12-28 RX ORDER — CETIRIZINE HYDROCHLORIDE 10 MG/1
10 TABLET ORAL DAILY
Qty: 90 TABLET | Refills: 1 | Status: SHIPPED | OUTPATIENT
Start: 2022-12-28 | End: 2023-07-19

## 2022-12-28 RX ORDER — HYDROCHLOROTHIAZIDE 25 MG/1
25 TABLET ORAL DAILY
Qty: 90 TABLET | Refills: 1 | Status: SHIPPED | OUTPATIENT
Start: 2022-12-28 | End: 2023-04-19

## 2022-12-28 RX ORDER — LOSARTAN POTASSIUM 50 MG/1
50 TABLET ORAL DAILY
Qty: 90 TABLET | Refills: 1 | Status: SHIPPED | OUTPATIENT
Start: 2022-12-28 | End: 2023-04-19

## 2022-12-28 RX ORDER — NITROGLYCERIN 0.4 MG/1
0.4 TABLET SUBLINGUAL EVERY 5 MIN PRN
Qty: 10 TABLET | Refills: 1 | Status: SHIPPED | OUTPATIENT
Start: 2022-12-28 | End: 2024-03-01 | Stop reason: SDUPTHER

## 2022-12-28 RX ORDER — LEVOTHYROXINE SODIUM 50 UG/1
TABLET ORAL
Qty: 136 TABLET | Refills: 1 | Status: SHIPPED | OUTPATIENT
Start: 2022-12-28 | End: 2023-07-19 | Stop reason: SDUPTHER

## 2022-12-28 NOTE — ASSESSMENT & PLAN NOTE
CBC and CMP obtained. Blood pressure well controlled at current visit. Continue current meds. Follow up in 3 months or as needed.

## 2022-12-28 NOTE — ASSESSMENT & PLAN NOTE
TSH obtained today. Continue Levothyroxine as ordered unless further instructed. Will follow up with labs.

## 2022-12-29 NOTE — PROGRESS NOTES
Labs reviewed: TSH normal. Continue current dose of Synthroid. Iron, B12, and Folate normal. CBC normal. CMP showed elevated BUN which is a measure of kidney function. It was about the same as it has been for th past year but make sure patient is staying well hydrated. Cholesterol was lower than it was 6 months ago but still elevated. I would recommend continuing low fat/low cholesterol diet and taking his Omega 3 fish oil. If patient wishes to be put on a statin I am agreeable to this. However, I feel at his age the statin side effects would not be worth the benefit he would receive of risk reduction. Please contact patient and discuss. Thanks

## 2023-01-19 ENCOUNTER — OFFICE VISIT (OUTPATIENT)
Dept: FAMILY MEDICINE | Facility: CLINIC | Age: 88
End: 2023-01-19
Payer: MEDICARE

## 2023-01-19 VITALS
HEART RATE: 79 BPM | HEIGHT: 75 IN | OXYGEN SATURATION: 97 % | TEMPERATURE: 97 F | WEIGHT: 219 LBS | SYSTOLIC BLOOD PRESSURE: 102 MMHG | RESPIRATION RATE: 20 BRPM | DIASTOLIC BLOOD PRESSURE: 72 MMHG | BODY MASS INDEX: 27.23 KG/M2

## 2023-01-19 DIAGNOSIS — R53.83 FATIGUE, UNSPECIFIED TYPE: Primary | ICD-10-CM

## 2023-01-19 LAB
BILIRUB SERPL-MCNC: NEGATIVE MG/DL
BLOOD URINE, POC: NEGATIVE
COLOR, POC UA: YELLOW
GLUCOSE UR QL STRIP: NEGATIVE
KETONES UR QL STRIP: NEGATIVE
LEUKOCYTE ESTERASE URINE, POC: NEGATIVE
NITRITE, POC UA: NEGAT IVE
PH, POC UA: 6
PROTEIN, POC: NORMAL
SPECIFIC GRAVITY, POC UA: 1.03
UROBILINOGEN, POC UA: 0.2

## 2023-01-19 PROCEDURE — 99213 OFFICE O/P EST LOW 20 MIN: CPT | Mod: ,,, | Performed by: NURSE PRACTITIONER

## 2023-01-19 PROCEDURE — 99213 PR OFFICE/OUTPT VISIT, EST, LEVL III, 20-29 MIN: ICD-10-PCS | Mod: ,,, | Performed by: NURSE PRACTITIONER

## 2023-01-19 PROCEDURE — 96372 THER/PROPH/DIAG INJ SC/IM: CPT | Mod: ,,, | Performed by: NURSE PRACTITIONER

## 2023-01-19 PROCEDURE — 81003 URINALYSIS AUTO W/O SCOPE: CPT | Mod: RHCUB | Performed by: NURSE PRACTITIONER

## 2023-01-19 PROCEDURE — 96372 PR INJECTION,THERAP/PROPH/DIAG2ST, IM OR SUBCUT: ICD-10-PCS | Mod: ,,, | Performed by: NURSE PRACTITIONER

## 2023-01-19 RX ORDER — FLUTICASONE PROPIONATE 50 MCG
SPRAY, SUSPENSION (ML) NASAL
COMMUNITY
Start: 2022-12-28

## 2023-01-19 RX ORDER — IPRATROPIUM BROMIDE 42 UG/1
SPRAY, METERED NASAL
COMMUNITY
Start: 2022-12-28

## 2023-01-19 RX ORDER — CYANOCOBALAMIN 1000 UG/ML
1000 INJECTION, SOLUTION INTRAMUSCULAR; SUBCUTANEOUS ONCE
Status: COMPLETED | OUTPATIENT
Start: 2023-01-19 | End: 2023-01-19

## 2023-01-19 RX ADMIN — CYANOCOBALAMIN 1000 MCG: 1000 INJECTION, SOLUTION INTRAMUSCULAR; SUBCUTANEOUS at 11:01

## 2023-01-19 NOTE — PROGRESS NOTES
Celine Avilez NP   [facility]  [address]  [place]  [address]      PATIENT NAME: Jan Stewart  : 1935  DATE: 23  MRN: [id number]      Billing Provider: Celine Avilez NP  Level of Service: [place] OFFICE/OUTPT VISIT, EST, LEVL III, 20-29 MIN  Patient PCP Information       Provider PCP Type    BLAYNE Olvera General            Reason for Visit / Chief Complaint: Fatigue (Was here for labs about three weeks ago and since has been very lethargic and wonders if he may have caught a bug when he was here. States he likes to walk and usually takes a nap but he is taking 3 -4 a day and doesn't want to exercise. They state he was pretty sick right after he was here last time )       Update PCP  Update Chief Complaint         History of Present Illness / Problem Focused Workflow     Jan Stewart presents to the clinic with Fatigue (Was here for labs about three weeks ago and since has been very lethargic and wonders if he may have caught a bug when he was here. States he likes to walk and usually takes a nap but he is taking 3 -4 a day and doesn't want to exercise. They state he was pretty sick right after he was here last time )     87 year old male presents to clinic with complaints of fatigue. He states he was here about 3 weeks ago and thinks he may have picked up something when he was here. States he had some nasal congestion, cough and some body aches after he came in. He states he has been taking more naps than usual and does not have any energy. Just wants to be checked to make sure nothing else is going on.         Review of Systems     @Review of Systems   Constitutional:  Positive for fatigue. Negative for activity change, appetite change and fever.   HENT:  Negative for nasal congestion, ear pain, rhinorrhea, sinus pressure/congestion and sore throat.    Eyes:  Negative for pain, redness, visual disturbance and eye dryness.   Respiratory:  Negative for cough and  shortness of breath.    Cardiovascular:  Negative for chest pain and leg swelling.   Gastrointestinal:  Negative for abdominal distention, abdominal pain, constipation and diarrhea.   Endocrine: Negative for cold intolerance, heat intolerance and polyuria.   Genitourinary:  Negative for bladder incontinence, dysuria, frequency and urgency.   Musculoskeletal:  Negative for arthralgias, gait problem and myalgias.   Integumentary:  Negative for color change, rash and wound.   Allergic/Immunologic: Negative for environmental allergies and food allergies.   Neurological:  Negative for dizziness, weakness, light-headedness and headaches.   Psychiatric/Behavioral:  Negative for behavioral problems and sleep disturbance.      Medical / Social / Family History     Past Medical History:   Diagnosis Date    Adult osteomalacia     Hyperlipidemia     Myalgia     Vitamin D deficiency        History reviewed. No pertinent surgical history.    Social History    reports that he has never smoked. He has never been exposed to tobacco smoke. He has never used smokeless tobacco. He reports that he does not drink alcohol and does not use drugs.    Family History  's family history includes Cancer in his brother; Diabetes in his mother; Heart disease in his mother.    Medications and Allergies     Medications  Outpatient Medications Marked as Taking for the 1/19/23 encounter (Office Visit) with Celine Avilez NP   Medication Sig Dispense Refill    ascorbic acid, vitamin C, (VITAMIN C) 500 MG tablet Take 500 mg by mouth once daily.      cholecalciferol, vitamin D3, (VITAMIN D3) 50 mcg (2,000 unit) Cap Take 2,000 Units by mouth once daily.      hydroCHLOROthiazide (HYDRODIURIL) 25 MG tablet Take 1 tablet (25 mg total) by mouth once daily. 90 tablet 1    levothyroxine (SYNTHROID) 50 MCG tablet Take 2 Tablets Monday andWed and  Take 1 Tablet Every Other Day. 136 tablet 1    losartan (COZAAR) 50 MG tablet Take 1 tablet (50 mg total)  by mouth once daily. 90 tablet 1    nitroGLYCERIN (NITROSTAT) 0.4 MG SL tablet Place 1 tablet (0.4 mg total) under the tongue every 5 (five) minutes as needed for Chest pain. May Repeat Every 5 Minutes. Seek Medical Attention If Pain Persists After 3 Tablets. 10 tablet 1    omega-3 fatty acids/fish oil (FISH OIL-OMEGA-3 FATTY ACIDS) 300-1,000 mg capsule Take 1 capsule by mouth once daily.         Allergies  Review of patient's allergies indicates:  No Known Allergies    Physical Examination     Vitals:    01/19/23 0954   BP: 102/72   Pulse: 79   Resp: 20   Temp: 97.1 °F (36.2 °C)     Physical Exam  Vitals and nursing note reviewed.   HENT:      Head: Normocephalic.      Right Ear: Tympanic membrane normal.      Left Ear: Tympanic membrane normal.      Nose: Nose normal.      Mouth/Throat:      Mouth: Mucous membranes are moist.      Pharynx: Oropharynx is clear. No posterior oropharyngeal erythema.   Eyes:      Conjunctiva/sclera: Conjunctivae normal.   Cardiovascular:      Rate and Rhythm: Normal rate and regular rhythm.      Pulses: Normal pulses.      Heart sounds: Normal heart sounds.   Pulmonary:      Effort: Pulmonary effort is normal.      Breath sounds: Normal breath sounds.   Abdominal:      General: Abdomen is flat. Bowel sounds are normal. There is no distension.      Palpations: Abdomen is soft.   Musculoskeletal:         General: No swelling or tenderness. Normal range of motion.      Cervical back: Normal range of motion.      Right lower leg: No edema.      Left lower leg: No edema.   Skin:     General: Skin is warm and dry.      Capillary Refill: Capillary refill takes less than 2 seconds.   Neurological:      Mental Status: He is alert. Mental status is at baseline.   Psychiatric:         Mood and Affect: Mood normal.         Behavior: Behavior normal.             Lab Results   Component Value Date    WBC 6.44 12/28/2022    HGB 16.8 12/28/2022    HCT 51.1 12/28/2022    MCV 94.8 12/28/2022      12/28/2022          Sodium   Date Value Ref Range Status   12/28/2022 137 136 - 145 mmol/L Final     Potassium   Date Value Ref Range Status   12/28/2022 4.5 3.5 - 5.1 mmol/L Final     Chloride   Date Value Ref Range Status   12/28/2022 101 98 - 107 mmol/L Final     CO2   Date Value Ref Range Status   12/28/2022 29 21 - 32 mmol/L Final     Glucose   Date Value Ref Range Status   12/28/2022 131 (H) 74 - 106 mg/dL Final     BUN   Date Value Ref Range Status   12/28/2022 27 (H) 7 - 18 mg/dL Final     Creatinine   Date Value Ref Range Status   12/28/2022 1.23 0.70 - 1.30 mg/dL Final     Calcium   Date Value Ref Range Status   12/28/2022 9.8 8.5 - 10.1 mg/dL Final     Total Protein   Date Value Ref Range Status   12/28/2022 7.7 6.4 - 8.2 g/dL Final     Albumin   Date Value Ref Range Status   12/28/2022 4.2 3.5 - 5.0 g/dL Final     Bilirubin, Total   Date Value Ref Range Status   12/28/2022 1.0 >0.0 - 1.2 mg/dL Final     Alk Phos   Date Value Ref Range Status   12/28/2022 75 45 - 115 U/L Final     AST   Date Value Ref Range Status   12/28/2022 21 15 - 37 U/L Final     ALT   Date Value Ref Range Status   12/28/2022 35 16 - 61 U/L Final     Anion Gap   Date Value Ref Range Status   12/28/2022 12 7 - 16 mmol/L Final     eGFR   Date Value Ref Range Status   06/13/2022 69 >=60 mL/min/1.73m² Final      No image results found.     Procedures   Assessment and Plan (including Health Maintenance)      Problem List  Smart Sets  Document Outside HM   :    Plan:           Problem List Items Addressed This Visit          Other    Fatigue - Primary    Current Assessment & Plan     UA obtained and was negative. CBC, Iron and TIBC, and Vitamin B12, TSH, CMP were all obtained a few weeks ago and were normal. Patient is requesting a Vitamin B12 injection today for fatigue. Vitamin B12 administered IM in clinic. Follow up if symptoms persist.          Relevant Orders    POCT URINALYSIS W/O SCOPE (Completed)       Health Maintenance Topics  with due status: Not Due       Topic Last Completion Date    Lipid Panel 12/28/2022       Future Appointments   Date Time Provider Department Center   7/19/2023  8:30 AM Celine Avilez NP [facility]        Health Maintenance Due   Topic Date Due    Pneumococcal Vaccines (Age 65+) (2 - PCV) 10/10/2014        Follow up in about 3 months (around 4/19/2023), or if symptoms worsen or fail to improve.     Signature:  Celine Avilez NP  [address]  [address]    Date of encounter: 1/19/23

## 2023-02-20 PROBLEM — R53.83 FATIGUE: Status: ACTIVE | Noted: 2023-02-20

## 2023-02-20 NOTE — ASSESSMENT & PLAN NOTE
UA obtained and was negative. CBC, Iron and TIBC, and Vitamin B12, TSH, CMP were all obtained a few weeks ago and were normal. Patient is requesting a Vitamin B12 injection today for fatigue. Vitamin B12 administered IM in clinic. Follow up if symptoms persist.

## 2023-07-19 ENCOUNTER — OFFICE VISIT (OUTPATIENT)
Dept: FAMILY MEDICINE | Facility: CLINIC | Age: 88
End: 2023-07-19
Payer: MEDICARE

## 2023-07-19 VITALS
TEMPERATURE: 98 F | RESPIRATION RATE: 17 BRPM | HEIGHT: 75 IN | BODY MASS INDEX: 26.98 KG/M2 | OXYGEN SATURATION: 96 % | HEART RATE: 73 BPM | SYSTOLIC BLOOD PRESSURE: 124 MMHG | WEIGHT: 217 LBS | DIASTOLIC BLOOD PRESSURE: 72 MMHG

## 2023-07-19 DIAGNOSIS — R25.2 LEG CRAMPS: ICD-10-CM

## 2023-07-19 DIAGNOSIS — E03.9 HYPOTHYROIDISM, UNSPECIFIED TYPE: ICD-10-CM

## 2023-07-19 DIAGNOSIS — R73.09 ELEVATED GLUCOSE: Primary | ICD-10-CM

## 2023-07-19 DIAGNOSIS — I10 ESSENTIAL HYPERTENSION, BENIGN: ICD-10-CM

## 2023-07-19 LAB
ALBUMIN SERPL BCP-MCNC: 3.8 G/DL (ref 3.5–5)
ALBUMIN/GLOB SERPL: 1.1 {RATIO}
ALP SERPL-CCNC: 77 U/L (ref 45–115)
ALT SERPL W P-5'-P-CCNC: 31 U/L (ref 16–61)
ANION GAP SERPL CALCULATED.3IONS-SCNC: 9 MMOL/L (ref 7–16)
AST SERPL W P-5'-P-CCNC: 21 U/L (ref 15–37)
BASOPHILS # BLD AUTO: 0.06 K/UL (ref 0–0.2)
BASOPHILS NFR BLD AUTO: 1 % (ref 0–1)
BILIRUB SERPL-MCNC: 0.9 MG/DL (ref ?–1.2)
BUN SERPL-MCNC: 27 MG/DL (ref 7–18)
BUN/CREAT SERPL: 26 (ref 6–20)
CALCIUM SERPL-MCNC: 9.6 MG/DL (ref 8.5–10.1)
CHLORIDE SERPL-SCNC: 105 MMOL/L (ref 98–107)
CHOLEST SERPL-MCNC: 215 MG/DL (ref 0–200)
CHOLEST/HDLC SERPL: 5.7 {RATIO}
CO2 SERPL-SCNC: 28 MMOL/L (ref 21–32)
CREAT SERPL-MCNC: 1.04 MG/DL (ref 0.7–1.3)
DIFFERENTIAL METHOD BLD: ABNORMAL
EGFR (NO RACE VARIABLE) (RUSH/TITUS): 69 ML/MIN/1.73M2
EOSINOPHIL # BLD AUTO: 0.15 K/UL (ref 0–0.5)
EOSINOPHIL NFR BLD AUTO: 2.5 % (ref 1–4)
ERYTHROCYTE [DISTWIDTH] IN BLOOD BY AUTOMATED COUNT: 13.2 % (ref 11.5–14.5)
EST. AVERAGE GLUCOSE BLD GHB EST-MCNC: 107 MG/DL
GLOBULIN SER-MCNC: 3.5 G/DL (ref 2–4)
GLUCOSE SERPL-MCNC: 118 MG/DL (ref 74–106)
HBA1C MFR BLD HPLC: 5.8 % (ref 4.5–6.6)
HCT VFR BLD AUTO: 45.4 % (ref 40–54)
HDLC SERPL-MCNC: 38 MG/DL (ref 40–60)
HGB BLD-MCNC: 15.7 G/DL (ref 13.5–18)
IMM GRANULOCYTES # BLD AUTO: 0.03 K/UL (ref 0–0.04)
IMM GRANULOCYTES NFR BLD: 0.5 % (ref 0–0.4)
LDLC SERPL CALC-MCNC: 142 MG/DL
LDLC/HDLC SERPL: 3.7 {RATIO}
LYMPHOCYTES # BLD AUTO: 1.56 K/UL (ref 1–4.8)
LYMPHOCYTES NFR BLD AUTO: 26.2 % (ref 27–41)
MAGNESIUM SERPL-MCNC: 2.4 MG/DL (ref 1.7–2.3)
MCH RBC QN AUTO: 31.9 PG (ref 27–31)
MCHC RBC AUTO-ENTMCNC: 34.6 G/DL (ref 32–36)
MCV RBC AUTO: 92.3 FL (ref 80–96)
MONOCYTES # BLD AUTO: 0.43 K/UL (ref 0–0.8)
MONOCYTES NFR BLD AUTO: 7.2 % (ref 2–6)
MPC BLD CALC-MCNC: 10.2 FL (ref 9.4–12.4)
NEUTROPHILS # BLD AUTO: 3.72 K/UL (ref 1.8–7.7)
NEUTROPHILS NFR BLD AUTO: 62.6 % (ref 53–65)
NONHDLC SERPL-MCNC: 177 MG/DL
NRBC # BLD AUTO: 0 X10E3/UL
NRBC, AUTO (.00): 0 %
PLATELET # BLD AUTO: 148 K/UL (ref 150–400)
POTASSIUM SERPL-SCNC: 3.9 MMOL/L (ref 3.5–5.1)
PROT SERPL-MCNC: 7.3 G/DL (ref 6.4–8.2)
RBC # BLD AUTO: 4.92 M/UL (ref 4.6–6.2)
SODIUM SERPL-SCNC: 138 MMOL/L (ref 136–145)
TRIGL SERPL-MCNC: 177 MG/DL (ref 35–150)
TSH SERPL DL<=0.005 MIU/L-ACNC: 2.43 UIU/ML (ref 0.36–3.74)
VLDLC SERPL-MCNC: 35 MG/DL
WBC # BLD AUTO: 5.95 K/UL (ref 4.5–11)

## 2023-07-19 PROCEDURE — 83036 HEMOGLOBIN GLYCOSYLATED A1C: CPT | Mod: ,,, | Performed by: CLINICAL MEDICAL LABORATORY

## 2023-07-19 PROCEDURE — 83036 HEMOGLOBIN A1C: ICD-10-PCS | Mod: ,,, | Performed by: CLINICAL MEDICAL LABORATORY

## 2023-07-19 PROCEDURE — 83735 MAGNESIUM: ICD-10-PCS | Mod: ,,, | Performed by: CLINICAL MEDICAL LABORATORY

## 2023-07-19 PROCEDURE — 80053 COMPREHEN METABOLIC PANEL: CPT | Mod: ,,, | Performed by: CLINICAL MEDICAL LABORATORY

## 2023-07-19 PROCEDURE — 85025 CBC WITH DIFFERENTIAL: ICD-10-PCS | Mod: ,,, | Performed by: CLINICAL MEDICAL LABORATORY

## 2023-07-19 PROCEDURE — 80061 LIPID PANEL: CPT | Mod: ,,, | Performed by: CLINICAL MEDICAL LABORATORY

## 2023-07-19 PROCEDURE — 84443 ASSAY THYROID STIM HORMONE: CPT | Mod: ,,, | Performed by: CLINICAL MEDICAL LABORATORY

## 2023-07-19 PROCEDURE — 99214 OFFICE O/P EST MOD 30 MIN: CPT | Mod: ,,, | Performed by: NURSE PRACTITIONER

## 2023-07-19 PROCEDURE — 83735 ASSAY OF MAGNESIUM: CPT | Mod: ,,, | Performed by: CLINICAL MEDICAL LABORATORY

## 2023-07-19 PROCEDURE — 80061 LIPID PANEL: ICD-10-PCS | Mod: ,,, | Performed by: CLINICAL MEDICAL LABORATORY

## 2023-07-19 PROCEDURE — 84443 TSH: ICD-10-PCS | Mod: ,,, | Performed by: CLINICAL MEDICAL LABORATORY

## 2023-07-19 PROCEDURE — 80053 COMPREHENSIVE METABOLIC PANEL: ICD-10-PCS | Mod: ,,, | Performed by: CLINICAL MEDICAL LABORATORY

## 2023-07-19 PROCEDURE — 99214 PR OFFICE/OUTPT VISIT, EST, LEVL IV, 30-39 MIN: ICD-10-PCS | Mod: ,,, | Performed by: NURSE PRACTITIONER

## 2023-07-19 PROCEDURE — 85025 COMPLETE CBC W/AUTO DIFF WBC: CPT | Mod: ,,, | Performed by: CLINICAL MEDICAL LABORATORY

## 2023-07-19 RX ORDER — HYDROCHLOROTHIAZIDE 25 MG/1
25 TABLET ORAL DAILY
Qty: 90 TABLET | Refills: 1 | Status: SHIPPED | OUTPATIENT
Start: 2023-07-19 | End: 2023-12-11 | Stop reason: SDUPTHER

## 2023-07-19 RX ORDER — LEVOTHYROXINE SODIUM 50 UG/1
TABLET ORAL
Qty: 136 TABLET | Refills: 1 | Status: SHIPPED | OUTPATIENT
Start: 2023-07-19 | End: 2024-02-14 | Stop reason: SDUPTHER

## 2023-07-19 RX ORDER — LOSARTAN POTASSIUM 50 MG/1
50 TABLET ORAL DAILY
Qty: 90 TABLET | Refills: 1 | Status: SHIPPED | OUTPATIENT
Start: 2023-07-19 | End: 2024-03-01 | Stop reason: SDUPTHER

## 2023-07-19 NOTE — PROGRESS NOTES
Celine Avilez NP   [facility]  [address]  [place]  [address]      PATIENT NAME: Jan Stewart  : 1935  DATE: 23  MRN: [id number]      Billing Provider: Celine Avilez NP  Level of Service: HI OFFICE/OUTPT VISIT, EST, LEVL IV, 30-39 MIN  Patient PCP Information       Provider PCP Type    Celine Avilez NP General            Reason for Visit / Chief Complaint: Follow-up (6 month follow up)         History of Present Illness / Problem Focused Workflow     Jan Stewart presents to the clinic with Follow-up (6 month follow up)     88 year old male presents to clinic for check up and med refills. States he has been doing well. He reports he has been randomly checking his glucose at home and states it has been around 120s which is concerning to him. Will check A1C per his request.         Review of Systems     @Review of Systems   Constitutional:  Negative for activity change, appetite change, fatigue and fever.   HENT:  Negative for nasal congestion, ear pain, rhinorrhea, sinus pressure/congestion and sore throat.    Eyes:  Negative for pain, redness, visual disturbance and eye dryness.   Respiratory:  Negative for cough and shortness of breath.    Cardiovascular:  Negative for chest pain and leg swelling.   Gastrointestinal:  Negative for abdominal distention, abdominal pain, constipation and diarrhea.   Endocrine: Negative for cold intolerance, heat intolerance and polyuria.   Genitourinary:  Negative for bladder incontinence, dysuria, frequency and urgency.   Musculoskeletal:  Negative for arthralgias, gait problem and myalgias.   Integumentary:  Negative for color change, rash and wound.   Allergic/Immunologic: Negative for environmental allergies and food allergies.   Neurological:  Negative for dizziness, weakness, light-headedness and headaches.   Psychiatric/Behavioral:  Negative for behavioral problems and sleep disturbance.        Medical / Social / Family History      Past Medical History:   Diagnosis Date    Adult osteomalacia     Hyperlipidemia     Myalgia     Vitamin D deficiency        History reviewed. No pertinent surgical history.    Social History    reports that he has never smoked. He has never been exposed to tobacco smoke. He has never used smokeless tobacco. He reports that he does not drink alcohol and does not use drugs.    Family History  's family history includes Cancer in his brother; Diabetes in his mother; Heart disease in his mother.    Medications and Allergies     Medications  Outpatient Medications Marked as Taking for the 7/19/23 encounter (Office Visit) with Celine Avilez NP   Medication Sig Dispense Refill    ascorbic acid, vitamin C, (VITAMIN C) 500 MG tablet Take 500 mg by mouth once daily.      cholecalciferol, vitamin D3, (VITAMIN D3) 50 mcg (2,000 unit) Cap Take 2,000 Units by mouth once daily.      omega-3 fatty acids/fish oil (FISH OIL-OMEGA-3 FATTY ACIDS) 300-1,000 mg capsule Take 1 capsule by mouth once daily.      [DISCONTINUED] hydroCHLOROthiazide (HYDRODIURIL) 25 MG tablet TAKE 1 TABLET BY MOUTH DAILY 90 tablet 1    [DISCONTINUED] levothyroxine (SYNTHROID) 50 MCG tablet Take 2 Tablets Monday andWed and  Take 1 Tablet Every Other Day. 136 tablet 1    [DISCONTINUED] losartan (COZAAR) 50 MG tablet TAKE 1 TABLET BY MOUTH DAILY 90 tablet 1       Allergies  Review of patient's allergies indicates:  No Known Allergies    Physical Examination     Vitals:    07/19/23 0833   BP: 124/72   Pulse: 73   Resp: 17   Temp: 97.7 °F (36.5 °C)     Physical Exam  Vitals and nursing note reviewed.   HENT:      Head: Normocephalic.      Right Ear: Tympanic membrane normal.      Left Ear: Tympanic membrane normal.      Nose: Nose normal.      Mouth/Throat:      Mouth: Mucous membranes are moist.      Pharynx: Oropharynx is clear. No posterior oropharyngeal erythema.   Eyes:      Conjunctiva/sclera: Conjunctivae normal.   Cardiovascular:      Rate  and Rhythm: Normal rate and regular rhythm.      Pulses: Normal pulses.      Heart sounds: Normal heart sounds.   Pulmonary:      Effort: Pulmonary effort is normal.      Breath sounds: Normal breath sounds.   Abdominal:      General: Abdomen is flat. Bowel sounds are normal. There is no distension.      Palpations: Abdomen is soft.   Musculoskeletal:         General: No swelling or tenderness. Normal range of motion.      Cervical back: Normal range of motion.      Right lower leg: No edema.      Left lower leg: No edema.   Skin:     General: Skin is warm and dry.      Capillary Refill: Capillary refill takes less than 2 seconds.   Neurological:      Mental Status: He is alert. Mental status is at baseline.   Psychiatric:         Mood and Affect: Mood normal.         Behavior: Behavior normal.               Lab Results   Component Value Date    WBC 5.95 07/19/2023    HGB 15.7 07/19/2023    HCT 45.4 07/19/2023    MCV 92.3 07/19/2023     (L) 07/19/2023        CMP  Sodium   Date Value Ref Range Status   07/19/2023 138 136 - 145 mmol/L Final     Potassium   Date Value Ref Range Status   07/19/2023 3.9 3.5 - 5.1 mmol/L Final     Chloride   Date Value Ref Range Status   07/19/2023 105 98 - 107 mmol/L Final     CO2   Date Value Ref Range Status   07/19/2023 28 21 - 32 mmol/L Final     Glucose   Date Value Ref Range Status   07/19/2023 118 (H) 74 - 106 mg/dL Final     BUN   Date Value Ref Range Status   07/19/2023 27 (H) 7 - 18 mg/dL Final     Creatinine   Date Value Ref Range Status   07/19/2023 1.04 0.70 - 1.30 mg/dL Final     Calcium   Date Value Ref Range Status   07/19/2023 9.6 8.5 - 10.1 mg/dL Final     Total Protein   Date Value Ref Range Status   07/19/2023 7.3 6.4 - 8.2 g/dL Final     Albumin   Date Value Ref Range Status   07/19/2023 3.8 3.5 - 5.0 g/dL Final     Bilirubin, Total   Date Value Ref Range Status   07/19/2023 0.9 >0.0 - 1.2 mg/dL Final     Alk Phos   Date Value Ref Range Status   07/19/2023 77  45 - 115 U/L Final     AST   Date Value Ref Range Status   07/19/2023 21 15 - 37 U/L Final     ALT   Date Value Ref Range Status   07/19/2023 31 16 - 61 U/L Final     Anion Gap   Date Value Ref Range Status   07/19/2023 9 7 - 16 mmol/L Final     eGFR   Date Value Ref Range Status   07/19/2023 69 >=60 mL/min/1.73m2 Final     Procedures   Assessment and Plan (including Health Maintenance)   :    Plan:           Problem List Items Addressed This Visit          Cardiac/Vascular    Essential hypertension, benign    Current Assessment & Plan     Blood pressure well controlled. Continue current medications. Follow up in 3 months or as needed.            Relevant Medications    hydroCHLOROthiazide (HYDRODIURIL) 25 MG tablet    losartan (COZAAR) 50 MG tablet    Other Relevant Orders    CBC Auto Differential (Completed)    Lipid Panel (Completed)    Comprehensive Metabolic Panel (Completed)       Endocrine    Hypothyroidism    Current Assessment & Plan     Clinically Euthyroid. TSH obtained today. Instructed we would call with results and make dose adjustments if necessary.  Follow-up in 3 months.             Relevant Medications    levothyroxine (SYNTHROID) 50 MCG tablet    Other Relevant Orders    TSH (Completed)     Other Visit Diagnoses       Elevated glucose    -  Primary    Relevant Orders    Hemoglobin A1C (Completed)    Leg cramps        Relevant Orders    Magnesium (Completed)            Health Maintenance Topics with due status: Not Due       Topic Last Completion Date    Influenza Vaccine 12/28/2022    Lipid Panel 07/19/2023       No future appointments.     Health Maintenance Due   Topic Date Due    COVID-19 Vaccine (1) Never done    Pneumococcal Vaccines (Age 65+) (2 - PCV) 10/10/2014        No follow-ups on file.     Signature:  Celine Avilez NP  [address] [place]  [address]    Date of encounter: 7/19/23

## 2023-07-20 NOTE — PROGRESS NOTES
"Labs reviewed: Cholesterol still slightly elevated but better than 6 months ago. We have discussed and decided Statin risk vs benefits do not warrant statin therapy at his age. His A1C was 5.8 which indicated "pre diabetes" he should continue to monitor his diet with low carb/no concentrated sweets. Otherwise labs normal or clinically insignificant. However, he will have questions about any abnormals so I will address them. Glucose slightly elevated at 118. BUN and BUN/Creatinine slightly elevated but remain around same level as they have been over the last couple of years. It is normal for kidney function to decrease as we age. Stay well hydrated. MCH slightly elevated- this is a measure of the amount of hemoglobin that is contained in your RBC and is nothing to be concerned over. Platelet count slightly low- this can be caused by recent viral or bacterial infection. Lymphocytes, Monocytes, and Granulocytes were slightly abnormal- these are types of WBC and could be caused by recent viral or bacterial infection.  None of these values are elevated or low enough to cause concern. We will continue to monitor. Patient had requested that his labs be printed and mailed to him. Please include this note with his results so that he can read over it. Thanks. "

## 2023-08-18 NOTE — ASSESSMENT & PLAN NOTE
Clinically Euthyroid. TSH obtained today. Instructed we would call with results and make dose adjustments if necessary.  Follow-up in 3 months.

## 2023-09-09 DIAGNOSIS — Z71.89 COMPLEX CARE COORDINATION: ICD-10-CM

## 2023-12-11 DIAGNOSIS — I10 ESSENTIAL HYPERTENSION, BENIGN: ICD-10-CM

## 2023-12-11 RX ORDER — HYDROCHLOROTHIAZIDE 25 MG/1
25 TABLET ORAL DAILY
Qty: 30 TABLET | Refills: 0 | Status: SHIPPED | OUTPATIENT
Start: 2023-12-11 | End: 2024-03-01 | Stop reason: SDUPTHER

## 2024-02-14 DIAGNOSIS — E03.9 HYPOTHYROIDISM, UNSPECIFIED TYPE: ICD-10-CM

## 2024-02-14 RX ORDER — LEVOTHYROXINE SODIUM 50 UG/1
TABLET ORAL
Qty: 38 TABLET | Refills: 0 | Status: SHIPPED | OUTPATIENT
Start: 2024-02-14 | End: 2024-03-01 | Stop reason: SDUPTHER

## 2024-03-01 ENCOUNTER — OFFICE VISIT (OUTPATIENT)
Dept: FAMILY MEDICINE | Facility: CLINIC | Age: 89
End: 2024-03-01
Payer: MEDICARE

## 2024-03-01 VITALS
TEMPERATURE: 98 F | OXYGEN SATURATION: 97 % | DIASTOLIC BLOOD PRESSURE: 84 MMHG | BODY MASS INDEX: 26.51 KG/M2 | WEIGHT: 213.19 LBS | RESPIRATION RATE: 18 BRPM | HEIGHT: 75 IN | HEART RATE: 78 BPM | SYSTOLIC BLOOD PRESSURE: 137 MMHG

## 2024-03-01 DIAGNOSIS — E78.2 MIXED HYPERLIPIDEMIA: ICD-10-CM

## 2024-03-01 DIAGNOSIS — L30.9 DERMATITIS: ICD-10-CM

## 2024-03-01 DIAGNOSIS — I10 ESSENTIAL HYPERTENSION, BENIGN: Primary | ICD-10-CM

## 2024-03-01 DIAGNOSIS — E03.9 HYPOTHYROIDISM, UNSPECIFIED TYPE: ICD-10-CM

## 2024-03-01 PROCEDURE — 99213 OFFICE O/P EST LOW 20 MIN: CPT | Mod: ,,, | Performed by: NURSE PRACTITIONER

## 2024-03-01 RX ORDER — NITROGLYCERIN 0.4 MG/1
0.4 TABLET SUBLINGUAL EVERY 5 MIN PRN
Qty: 10 TABLET | Refills: 1 | Status: SHIPPED | OUTPATIENT
Start: 2024-03-01

## 2024-03-01 RX ORDER — LEVOTHYROXINE SODIUM 50 UG/1
TABLET ORAL
Qty: 114 TABLET | Refills: 1 | Status: SHIPPED | OUTPATIENT
Start: 2024-03-01

## 2024-03-01 RX ORDER — CLOBETASOL PROPIONATE 0.5 MG/G
CREAM TOPICAL 2 TIMES DAILY
Qty: 30 G | Refills: 0 | Status: SHIPPED | OUTPATIENT
Start: 2024-03-01

## 2024-03-01 RX ORDER — HYDROCHLOROTHIAZIDE 25 MG/1
25 TABLET ORAL DAILY
Qty: 90 TABLET | Refills: 1 | Status: SHIPPED | OUTPATIENT
Start: 2024-03-01

## 2024-03-01 RX ORDER — LOSARTAN POTASSIUM 50 MG/1
50 TABLET ORAL DAILY
Qty: 90 TABLET | Refills: 1 | Status: SHIPPED | OUTPATIENT
Start: 2024-03-01

## 2024-03-01 NOTE — PROGRESS NOTES
Celine Avilez NP   [facility] [address]  [place]  [address]      PATIENT NAME: Jan Stewart  : 1935  DATE: 3/1/24  MRN: [id number]      Billing Provider: Celine Avilez NP  Level of Service: DE OFFICE/OUTPT VISIT, EST, LEVL III, 20-29 MIN  Patient PCP Information       Provider PCP Type    Celine Avilez NP General            Reason for Visit / Chief Complaint: Follow-up (Patient is an 88 year old male who presents to clinic for 6 month follow up.  ), Health Maintenance (COVID-19 Vaccine(1) Never done--declined /TETANUS VACCINE Never done--declined /Shingles Vaccine(1 of 2) Never done--declined/RSV Vaccine (Age 60+ and Pregnant patients)(1 - 1-dose 60+ series) Never done--declined /Pneumococcal Vaccines (Age 65+)(2 of 2 - PCV) due on 10/10/2014--declined /Influenza Vaccine(1) due on 2023--declined /), Rash (Patient is requesting Clobetasol cream related to rash periodically. ), and Thyroid Problem (Patient is requesting thyroid medication go to "[facility].)         History of Present Illness / Problem Focused Workflow     Patient is an 88 year old male who presents to clinic for 6 month follow up.    Rash: Patient is requesting Clobetasol cream related to rash to left leg periodically.   Thyroid Problem: Patient is requesting thyroid medication go to "[facility].            Review of Systems     @Review of Systems   Constitutional:  Negative for activity change, appetite change, fatigue and fever.   HENT:  Negative for nasal congestion, ear pain, rhinorrhea, sinus pressure/congestion and sore throat.    Eyes:  Negative for pain, redness, visual disturbance and eye dryness.   Respiratory:  Negative for cough and shortness of breath.    Cardiovascular:  Negative for chest pain and leg swelling.   Gastrointestinal:  Negative for abdominal distention, abdominal pain, constipation and diarrhea.   Endocrine: Negative for cold intolerance, heat intolerance and polyuria.    Genitourinary:  Negative for bladder incontinence, dysuria, frequency and urgency.   Musculoskeletal:  Negative for arthralgias, gait problem and myalgias.   Integumentary:  Positive for rash. Negative for color change and wound.   Allergic/Immunologic: Negative for environmental allergies and food allergies.   Neurological:  Negative for dizziness, weakness, light-headedness and headaches.   Psychiatric/Behavioral:  Negative for behavioral problems and sleep disturbance.        Medical / Social / Family History     Past Medical History:   Diagnosis Date    Adult osteomalacia     Hyperlipidemia     Myalgia     Vitamin D deficiency        History reviewed. No pertinent surgical history.    Medications and Allergies     Medications  Outpatient Medications Marked as Taking for the 3/1/24 encounter (Office Visit) with Celine Avilez NP   Medication Sig Dispense Refill    ascorbic acid, vitamin C, (VITAMIN C) 500 MG tablet Take 500 mg by mouth once daily.      cholecalciferol, vitamin D3, (VITAMIN D3) 50 mcg (2,000 unit) Cap Take 2,000 Units by mouth once daily.      omega-3 fatty acids/fish oil (FISH OIL-OMEGA-3 FATTY ACIDS) 300-1,000 mg capsule Take 1 capsule by mouth once daily.      [DISCONTINUED] hydroCHLOROthiazide (HYDRODIURIL) 25 MG tablet Take 1 tablet (25 mg total) by mouth once daily. 30 tablet 0    [DISCONTINUED] levothyroxine (SYNTHROID) 50 MCG tablet Take 2 Tablets Monday andWed and  Take 1 Tablet Every Other Day. (Patient taking differently: Take 2 Tablets Wednesday and Saturday  Take 1 Tablet Every Other Day.) 38 tablet 0    [DISCONTINUED] losartan (COZAAR) 50 MG tablet Take 1 tablet (50 mg total) by mouth once daily. 90 tablet 1    [DISCONTINUED] nitroGLYCERIN (NITROSTAT) 0.4 MG SL tablet Place 1 tablet (0.4 mg total) under the tongue every 5 (five) minutes as needed for Chest pain. May Repeat Every 5 Minutes. Seek Medical Attention If Pain Persists After 3 Tablets. 10 tablet 1        Allergies  Review of patient's allergies indicates:  No Known Allergies    Physical Examination     Vitals:    03/01/24 1017   BP: 137/84   Pulse: 78   Resp: 18   Temp: 97.8 °F (36.6 °C)     Physical Exam  Vitals and nursing note reviewed.   Constitutional:       Comments: Elderly and frail   HENT:      Head: Normocephalic.      Right Ear: Tympanic membrane normal.      Left Ear: Tympanic membrane normal.      Nose: Nose normal.      Mouth/Throat:      Mouth: Mucous membranes are moist.      Pharynx: Oropharynx is clear. No posterior oropharyngeal erythema.   Eyes:      Conjunctiva/sclera: Conjunctivae normal.   Cardiovascular:      Rate and Rhythm: Normal rate and regular rhythm.      Pulses: Normal pulses.      Heart sounds: Normal heart sounds.   Pulmonary:      Effort: Pulmonary effort is normal.      Breath sounds: Normal breath sounds.   Abdominal:      General: Abdomen is flat. Bowel sounds are normal. There is no distension.      Palpations: Abdomen is soft.   Musculoskeletal:         General: No swelling or tenderness. Normal range of motion.      Cervical back: Normal range of motion.      Right lower leg: No edema.      Left lower leg: No edema.   Skin:     General: Skin is warm and dry.      Capillary Refill: Capillary refill takes less than 2 seconds.      Findings: Rash present.   Neurological:      Mental Status: He is alert. Mental status is at baseline.   Psychiatric:         Mood and Affect: Mood normal.         Behavior: Behavior normal.               Lab Results   Component Value Date    WBC 5.95 07/19/2023    HGB 15.7 07/19/2023    HCT 45.4 07/19/2023    MCV 92.3 07/19/2023     (L) 07/19/2023        CMP  Sodium   Date Value Ref Range Status   07/19/2023 138 136 - 145 mmol/L Final     Potassium   Date Value Ref Range Status   07/19/2023 3.9 3.5 - 5.1 mmol/L Final     Chloride   Date Value Ref Range Status   07/19/2023 105 98 - 107 mmol/L Final     CO2   Date Value Ref Range Status    07/19/2023 28 21 - 32 mmol/L Final     Glucose   Date Value Ref Range Status   07/19/2023 118 (H) 74 - 106 mg/dL Final     BUN   Date Value Ref Range Status   07/19/2023 27 (H) 7 - 18 mg/dL Final     Creatinine   Date Value Ref Range Status   07/19/2023 1.04 0.70 - 1.30 mg/dL Final     Calcium   Date Value Ref Range Status   07/19/2023 9.6 8.5 - 10.1 mg/dL Final     Total Protein   Date Value Ref Range Status   07/19/2023 7.3 6.4 - 8.2 g/dL Final     Albumin   Date Value Ref Range Status   07/19/2023 3.8 3.5 - 5.0 g/dL Final     Bilirubin, Total   Date Value Ref Range Status   07/19/2023 0.9 >0.0 - 1.2 mg/dL Final     Alk Phos   Date Value Ref Range Status   07/19/2023 77 45 - 115 U/L Final     AST   Date Value Ref Range Status   07/19/2023 21 15 - 37 U/L Final     ALT   Date Value Ref Range Status   07/19/2023 31 16 - 61 U/L Final     Anion Gap   Date Value Ref Range Status   07/19/2023 9 7 - 16 mmol/L Final     eGFR   Date Value Ref Range Status   07/19/2023 69 >=60 mL/min/1.73m2 Final     Procedures   Assessment and Plan (including Health Maintenance)   :    Plan:     Problem List Items Addressed This Visit          Derm    Dermatitis    Relevant Medications    clobetasoL (TEMOVATE) 0.05 % cream       Cardiac/Vascular    Essential hypertension, benign - Primary    Current Assessment & Plan     Blood pressure well controlled. Continue current medications. Follow up in 3 months or as needed.            Relevant Medications    hydroCHLOROthiazide (HYDRODIURIL) 25 MG tablet    losartan (COZAAR) 50 MG tablet    Hyperlipidemia    Current Assessment & Plan     Continue current meds and low fat/low cholesterol diet. Will recheck lipid panel at next viist.             Endocrine    Hypothyroidism    Current Assessment & Plan     Clinically Euthyroid. Continue current dosage of Levothyroxine. Follow-up in 3 months.             Relevant Medications    levothyroxine (SYNTHROID) 50 MCG tablet       Health Maintenance Topics  with due status: Not Due       Topic Last Completion Date    Lipid Panel 07/19/2023       No future appointments.     Health Maintenance Due   Topic Date Due    COVID-19 Vaccine (1) Never done    TETANUS VACCINE  Never done    Shingles Vaccine (1 of 2) Never done    RSV Vaccine (Age 60+ and Pregnant patients) (1 - 1-dose 60+ series) Never done    Pneumococcal Vaccines (Age 65+) (2 of 2 - PCV) 10/10/2014    Influenza Vaccine (1) 09/01/2023          Signature:  Celine Avilez NP  [address]  [address]    Date of encounter: 3/1/24

## 2024-04-09 DIAGNOSIS — Z71.89 COMPLEX CARE COORDINATION: ICD-10-CM

## 2024-05-10 NOTE — ASSESSMENT & PLAN NOTE
Blood pressure well controlled. Continue current medications. Follow up in 3 months or as needed.     
Clinically Euthyroid. Continue current dosage of Levothyroxine. Follow-up in 3 months.      
Continue current meds and low fat/low cholesterol diet. Will recheck lipid panel at next viist.   
yes

## 2024-07-31 ENCOUNTER — OFFICE VISIT (OUTPATIENT)
Dept: FAMILY MEDICINE | Facility: CLINIC | Age: 89
End: 2024-07-31
Payer: MEDICARE

## 2024-07-31 VITALS
BODY MASS INDEX: 27.53 KG/M2 | TEMPERATURE: 98 F | HEIGHT: 75 IN | HEART RATE: 69 BPM | SYSTOLIC BLOOD PRESSURE: 125 MMHG | OXYGEN SATURATION: 96 % | WEIGHT: 221.38 LBS | DIASTOLIC BLOOD PRESSURE: 75 MMHG | RESPIRATION RATE: 18 BRPM

## 2024-07-31 DIAGNOSIS — I10 ESSENTIAL HYPERTENSION, BENIGN: Primary | ICD-10-CM

## 2024-07-31 DIAGNOSIS — H57.9 ALLERGIC EYE REACTION: ICD-10-CM

## 2024-07-31 DIAGNOSIS — H65.93 MIDDLE EAR EFFUSION, BILATERAL: ICD-10-CM

## 2024-07-31 PROCEDURE — 99213 OFFICE O/P EST LOW 20 MIN: CPT | Mod: ,,, | Performed by: NURSE PRACTITIONER

## 2024-07-31 RX ORDER — MELOXICAM 7.5 MG/1
7.5 TABLET ORAL DAILY
Qty: 30 TABLET | Refills: 1 | Status: SHIPPED | OUTPATIENT
Start: 2024-07-31 | End: 2024-07-31

## 2024-07-31 RX ORDER — FLUTICASONE PROPIONATE 50 MCG
1 SPRAY, SUSPENSION (ML) NASAL DAILY
Qty: 16 ML | Refills: 1 | Status: SHIPPED | OUTPATIENT
Start: 2024-07-31 | End: 2024-07-31

## 2024-07-31 RX ORDER — OLOPATADINE HYDROCHLORIDE 1 MG/ML
1 SOLUTION/ DROPS OPHTHALMIC 2 TIMES DAILY
Qty: 5 ML | Refills: 1 | Status: SHIPPED | OUTPATIENT
Start: 2024-07-31 | End: 2024-07-31

## 2024-07-31 RX ORDER — MELOXICAM 7.5 MG/1
7.5 TABLET ORAL DAILY
Qty: 30 TABLET | Refills: 1 | Status: SHIPPED | OUTPATIENT
Start: 2024-07-31

## 2024-07-31 RX ORDER — OLOPATADINE HYDROCHLORIDE 1 MG/ML
1 SOLUTION/ DROPS OPHTHALMIC 2 TIMES DAILY
Qty: 5 ML | Refills: 1 | Status: SHIPPED | OUTPATIENT
Start: 2024-07-31 | End: 2025-07-31

## 2024-07-31 RX ORDER — FLUTICASONE PROPIONATE 50 MCG
1 SPRAY, SUSPENSION (ML) NASAL DAILY
Qty: 16 ML | Refills: 1 | Status: SHIPPED | OUTPATIENT
Start: 2024-07-31

## 2024-07-31 NOTE — PROGRESS NOTES
Celine Avilez NP   [facility]  [address]  [place]  [address]      PATIENT NAME: Jan Stewart  : 1935  DATE: 24  MRN: [id number]      Billing Provider: Celine Avilez NP  Level of Service: [place] OFFICE/OUTPT VISIT, EST, LEVL III, 20-29 MIN  Patient PCP Information       Provider PCP Type    Celine Avilez NP General            Reason for Visit / Chief Complaint: Otalgia (Jan Stewart 89 year old white male presents with bilateral ear pain. Left ear has intermittent pain below ear canal behind ear that radiates through bone behind left ear and through back of left jaw mostly when ear pops which causes him to sneeze. Right ear feels full. His wife has been using a Qtip to put Clotrimazole and Betamethasone Dipropionate Cream USP on the inside of ear canal. MR. Montoya thinks this  has helped some. He thinks he has some allergies. He is on Zyrtec 1x daily. ) and Dry Eye (He has dry watery eyes very often. He uses OTC drops currently, but his wife believe he would benefit greatly from a medicated eye drop for this symptom.)         History of Present Illness / Problem Focused Workflow     89 year old white male presents to clinic with complaints of bilateral ear fullness.  Left ear has intermittent pain below ear canal behind ear that radiates through bone behind left ear and through back of left jaw mostly when ear pops which causes him to sneeze. Right ear feels full. His wife has been using a Qtip to put Clotrimazole and Betamethasone Dipropionate Cream USP on the inside of ear canal. MR. Montoya thinks this  has helped some. He thinks he has some allergies. He is on Zyrtec 1x daily.   Dry Eye: He has dry watery eyes very often. He uses OTC drops currently, but his wife believe he would benefit greatly from a medicated eye drop for this symptom.            Review of Systems     @Review of Systems   Constitutional:  Negative for activity change, appetite change, fatigue and fever.    HENT:  Positive for ear pain and sneezing. Negative for nasal congestion, rhinorrhea, sinus pressure/congestion and sore throat.    Eyes:  Positive for itching and eye dryness. Negative for pain, redness and visual disturbance.   Respiratory:  Negative for cough and shortness of breath.    Cardiovascular:  Negative for chest pain and leg swelling.   Gastrointestinal:  Negative for abdominal distention, abdominal pain, constipation and diarrhea.   Endocrine: Negative for cold intolerance, heat intolerance and polyuria.   Genitourinary:  Negative for bladder incontinence, dysuria, frequency and urgency.   Musculoskeletal:  Negative for arthralgias, gait problem and myalgias.   Integumentary:  Negative for color change, rash and wound.   Allergic/Immunologic: Negative for environmental allergies and food allergies.   Neurological:  Negative for dizziness, weakness, light-headedness and headaches.   Psychiatric/Behavioral:  Negative for behavioral problems and sleep disturbance.        Medical / Social / Family History     Past Medical History:   Diagnosis Date    Adult osteomalacia     Allergic rhinitis     Hyperlipidemia     Myalgia     Vitamin D deficiency        History reviewed. No pertinent surgical history.    Medications and Allergies     Medications  Outpatient Medications Marked as Taking for the 7/31/24 encounter (Office Visit) with Celine Avilez NP   Medication Sig Dispense Refill    cholecalciferol, vitamin D3, (VITAMIN D3) 50 mcg (2,000 unit) Cap Take 2,000 Units by mouth once daily.      clobetasoL (TEMOVATE) 0.05 % cream Apply topically 2 (two) times daily. 30 g 0    hydroCHLOROthiazide (HYDRODIURIL) 25 MG tablet Take 1 tablet (25 mg total) by mouth once daily. 90 tablet 1    ipratropium (ATROVENT) 42 mcg (0.06 %) nasal spray SPRAY 2 SPRAYS in each nostril THREE TIMES DAILY. FOR 30 DAYS THEN USE AS NEEDED FOR NASAL DRAINAGE OR SNEEZING      levothyroxine (SYNTHROID) 50 MCG tablet Take 2 Tablets  Wednesday and Saturday  Take 1 Tablet Every Other Day. 114 tablet 1    losartan (COZAAR) 50 MG tablet Take 1 tablet (50 mg total) by mouth once daily. 90 tablet 1    nitroGLYCERIN (NITROSTAT) 0.4 MG SL tablet Place 1 tablet (0.4 mg total) under the tongue every 5 (five) minutes as needed for Chest pain. May Repeat Every 5 Minutes. Seek Medical Attention If Pain Persists After 3 Tablets. 10 tablet 1    omega-3 fatty acids/fish oil (FISH OIL-OMEGA-3 FATTY ACIDS) 300-1,000 mg capsule Take 1 capsule by mouth once daily.      [DISCONTINUED] fluticasone propionate (FLONASE) 50 mcg/actuation nasal spray SPRAY 1 SPRAY in each nostril 2 TIMES A DAY FOR 30 DAYS THEN USE AS NEEDED AFTER THAT         Allergies  Review of patient's allergies indicates:  No Known Allergies    Physical Examination     Vitals:    07/31/24 1636   BP: 125/75   Pulse: 69   Resp: 18   Temp: 98 °F (36.7 °C)     Physical Exam  Vitals and nursing note reviewed.   Constitutional:       Comments: Elderly and frail    HENT:      Head: Normocephalic.      Right Ear: A middle ear effusion is present.      Left Ear: A middle ear effusion is present.      Nose: Nose normal.      Mouth/Throat:      Mouth: Mucous membranes are moist.      Pharynx: Oropharynx is clear. No posterior oropharyngeal erythema.   Eyes:      General: Lids are normal.      Conjunctiva/sclera: Conjunctivae normal.      Right eye: Right conjunctiva is not injected.      Left eye: Left conjunctiva is not injected.   Cardiovascular:      Rate and Rhythm: Normal rate and regular rhythm.      Pulses: Normal pulses.      Heart sounds: Normal heart sounds.   Pulmonary:      Effort: Pulmonary effort is normal.      Breath sounds: Normal breath sounds.   Abdominal:      General: Abdomen is flat. Bowel sounds are normal. There is no distension.      Palpations: Abdomen is soft.   Musculoskeletal:         General: No swelling or tenderness. Normal range of motion.      Cervical back: Normal range of  motion.      Right lower leg: No edema.      Left lower leg: No edema.   Skin:     General: Skin is warm and dry.      Capillary Refill: Capillary refill takes less than 2 seconds.   Neurological:      Mental Status: He is alert. Mental status is at baseline.   Psychiatric:         Mood and Affect: Mood normal.         Behavior: Behavior normal.               Lab Results   Component Value Date    WBC 5.95 07/19/2023    HGB 15.7 07/19/2023    HCT 45.4 07/19/2023    MCV 92.3 07/19/2023     (L) 07/19/2023        CMP  Sodium   Date Value Ref Range Status   07/19/2023 138 136 - 145 mmol/L Final     Potassium   Date Value Ref Range Status   07/19/2023 3.9 3.5 - 5.1 mmol/L Final     Chloride   Date Value Ref Range Status   07/19/2023 105 98 - 107 mmol/L Final     CO2   Date Value Ref Range Status   07/19/2023 28 21 - 32 mmol/L Final     Glucose   Date Value Ref Range Status   07/19/2023 118 (H) 74 - 106 mg/dL Final     BUN   Date Value Ref Range Status   07/19/2023 27 (H) 7 - 18 mg/dL Final     Creatinine   Date Value Ref Range Status   07/19/2023 1.04 0.70 - 1.30 mg/dL Final     Calcium   Date Value Ref Range Status   07/19/2023 9.6 8.5 - 10.1 mg/dL Final     Total Protein   Date Value Ref Range Status   07/19/2023 7.3 6.4 - 8.2 g/dL Final     Albumin   Date Value Ref Range Status   07/19/2023 3.8 3.5 - 5.0 g/dL Final     Bilirubin, Total   Date Value Ref Range Status   07/19/2023 0.9 >0.0 - 1.2 mg/dL Final     Alk Phos   Date Value Ref Range Status   07/19/2023 77 45 - 115 U/L Final     AST   Date Value Ref Range Status   07/19/2023 21 15 - 37 U/L Final     ALT   Date Value Ref Range Status   07/19/2023 31 16 - 61 U/L Final     Anion Gap   Date Value Ref Range Status   07/19/2023 9 7 - 16 mmol/L Final     eGFR   Date Value Ref Range Status   07/19/2023 69 >=60 mL/min/1.73m2 Final     Procedures   Assessment and Plan (including Health Maintenance)   :    Plan:     Problem List Items Addressed This Visit           Ophtho    Allergic eye reaction    Relevant Medications    olopatadine (PATANOL) 0.1 % ophthalmic solution       ENT    Middle ear effusion, bilateral    Current Assessment & Plan     Flonase as ordered.     Reviewed pathology of current symptoms, medication side effects/risk/benefits/directions on taking medications, and monitor for discussed worsening symptoms that  require re-evaluation in clinic or if after hours go to ER. Be aware of the possibility of losing your balance, which can lead to falling and serious injury. Use the inhaled corticosteroid and/or oral  decongestant as directed. Change positions slowly.           Relevant Medications    fluticasone propionate (FLONASE) 50 mcg/actuation nasal spray       Cardiac/Vascular    Essential hypertension, benign - Primary    Relevant Orders    Lipid Panel       Health Maintenance Topics with due status: Not Due       Topic Last Completion Date    Influenza Vaccine 12/28/2022    Lipid Panel 07/19/2023       No future appointments.     Health Maintenance Due   Topic Date Due    TETANUS VACCINE  Never done    Shingles Vaccine (1 of 2) Never done    RSV Vaccine (Age 60+ and Pregnant patients) (1 - 1-dose 60+ series) Never done    Pneumococcal Vaccines (Age 65+) (2 of 2 - PCV) 10/10/2014    COVID-19 Vaccine (1 - 2023-24 season) Never done          Signature:  Celine Avilez NP  [address]  [address]    Date of encounter: 7/31/24

## 2024-08-11 PROBLEM — H57.9 ALLERGIC EYE REACTION: Status: ACTIVE | Noted: 2024-08-11

## 2024-08-11 PROBLEM — H65.93 MIDDLE EAR EFFUSION, BILATERAL: Status: ACTIVE | Noted: 2024-08-11

## 2024-08-12 NOTE — ASSESSMENT & PLAN NOTE
Flonase as ordered.     Reviewed pathology of current symptoms, medication side effects/risk/benefits/directions on taking medications, and monitor for discussed worsening symptoms that  require re-evaluation in clinic or if after hours go to ER. Be aware of the possibility of losing your balance, which can lead to falling and serious injury. Use the inhaled corticosteroid and/or oral  decongestant as directed. Change positions slowly.

## 2024-08-21 DIAGNOSIS — E03.9 HYPOTHYROIDISM, UNSPECIFIED TYPE: Primary | ICD-10-CM

## 2024-08-21 DIAGNOSIS — R79.9 ABNORMAL FINDING OF BLOOD CHEMISTRY, UNSPECIFIED: Primary | ICD-10-CM

## 2024-08-22 DIAGNOSIS — I10 ESSENTIAL HYPERTENSION, BENIGN: ICD-10-CM

## 2024-08-22 DIAGNOSIS — E03.9 HYPOTHYROIDISM, UNSPECIFIED TYPE: ICD-10-CM

## 2024-08-22 RX ORDER — LEVOTHYROXINE SODIUM 50 UG/1
TABLET ORAL
Qty: 114 TABLET | Refills: 1 | Status: SHIPPED | OUTPATIENT
Start: 2024-08-22

## 2024-08-22 NOTE — TELEPHONE ENCOUNTER
Pt is requesting refills on blood pressure medication to be sent to [facility] in [place].

## 2024-08-23 RX ORDER — LOSARTAN POTASSIUM 50 MG/1
50 TABLET ORAL DAILY
Qty: 90 TABLET | Refills: 1 | Status: SHIPPED | OUTPATIENT
Start: 2024-08-23

## 2024-08-23 RX ORDER — HYDROCHLOROTHIAZIDE 25 MG/1
25 TABLET ORAL DAILY
Qty: 90 TABLET | Refills: 1 | Status: SHIPPED | OUTPATIENT
Start: 2024-08-23

## 2024-08-27 ENCOUNTER — TELEPHONE (OUTPATIENT)
Dept: FAMILY MEDICINE | Facility: CLINIC | Age: 89
End: 2024-08-27
Payer: MEDICARE

## 2024-08-27 ENCOUNTER — OFFICE VISIT (OUTPATIENT)
Dept: FAMILY MEDICINE | Facility: CLINIC | Age: 89
End: 2024-08-27
Payer: MEDICARE

## 2024-08-27 VITALS
DIASTOLIC BLOOD PRESSURE: 70 MMHG | SYSTOLIC BLOOD PRESSURE: 105 MMHG | HEIGHT: 75 IN | BODY MASS INDEX: 25.86 KG/M2 | OXYGEN SATURATION: 97 % | TEMPERATURE: 98 F | RESPIRATION RATE: 17 BRPM | WEIGHT: 208 LBS | HEART RATE: 70 BPM

## 2024-08-27 DIAGNOSIS — I10 ESSENTIAL HYPERTENSION, BENIGN: ICD-10-CM

## 2024-08-27 PROCEDURE — 99213 OFFICE O/P EST LOW 20 MIN: CPT | Mod: ,,, | Performed by: NURSE PRACTITIONER

## 2024-08-27 RX ORDER — LOSARTAN POTASSIUM 50 MG/1
25 TABLET ORAL DAILY
Start: 2024-08-27

## 2024-08-27 NOTE — TELEPHONE ENCOUNTER
----- Message from Nae Peacock sent at 8/27/2024  9:54 AM CDT -----  Patient would like to discuss lowering his blood pressure medication. Number on file is correct.

## 2024-08-27 NOTE — ASSESSMENT & PLAN NOTE
Having some hypotension. Decrease Losartan to 25mg daily. He has just picked up refill so instructed him to take half tab of Losartan 50mg daily. Increase fluids. Continue to monitor BP and keep log. Follow up in 3 months or as needed.

## 2024-08-27 NOTE — TELEPHONE ENCOUNTER
Returned Pt's call. Pt reports he has recently made several diet and lifestyle changes to try to get a better control of his blood pressure. He has been engaging in moderate intensity exercise such as walking, stretching and weight resistance exercises, and improved his diet. He reported a BP of 101/65 yesterday and 99/59 today and wants to have his blood pressure medications lowered. Notified Pt that he will need an office visit to discuss any medication changes with the provider. He scheduled an appt for today 8/27/2024. Instructed Pt to bring all of his medications, his home blood pressure cuff, and his blood pressure log with him to his appt. He voiced understanding.

## 2024-08-27 NOTE — PROGRESS NOTES
Celine Avilez NP   [facility]  [address]  [place]  [address]      PATIENT NAME: Jan Stewart  : 1935  DATE: 24  MRN: [id number]      Billing Provider: Celine Avilez NP  Level of Service: [place] OFFICE/OUTPT VISIT, EST, LEVL III, 20-29 MIN  Patient PCP Information       Provider PCP Type    Celine Avilez NP General            Reason for Visit / Chief Complaint: Hypertension (Jan Stewart 89 year old male presents to clinic to discuss current BP medication Losartan 50 mg. Reports BP monitor from home readings were 99/59 with pulse 65 and yesterday 101/65 pulse 62.  He did bring BP monitor to be calibrated today.  BP manually taken at clinic 105/70 with pulse 70 and patients machine 121/64 with 74 pulse. Systolic 16 points off and diastolic 6 points off.) and Health Maintenance (TETANUS VACCINE Never done/Shingles Vaccine(1 of 2) Never done/RSV Vaccine (Age 60+ and Pregnant patients)(1 - 1-dose 60+ series) Never done/Pneumococcal Vaccines (Age 65+)(2 of 2 - PCV) due on 10/10/2014/COVID-19 Vaccine( - - season) Never done/Patient permanently refused all of these care gaps.)         History of Present Illness / Problem Focused Workflow     89 year old male presents to clinic to discuss current BP medication Losartan 50 mg. Reports BP monitor from home readings were 99/59 with pulse 65 and yesterday 101/65 pulse 62.  He did bring BP monitor to be calibrated today.  BP manually taken at clinic 105/70 with pulse 70 and patients machine 121/64 with 74 pulse. HE states he does have occasional lightheadedness. Has improved diet and increased exercise so thinks this may be why his BP is lower.         Review of Systems     @Review of Systems   Constitutional:  Negative for activity change, appetite change, fatigue and fever.   HENT:  Negative for nasal congestion, ear pain, rhinorrhea, sinus pressure/congestion and sore throat.    Eyes:  Negative for pain, redness, visual  disturbance and eye dryness.   Respiratory:  Negative for cough and shortness of breath.    Cardiovascular:  Negative for chest pain and leg swelling.   Gastrointestinal:  Negative for abdominal distention, abdominal pain, constipation and diarrhea.   Endocrine: Negative for cold intolerance, heat intolerance and polyuria.   Genitourinary:  Negative for bladder incontinence, dysuria, frequency and urgency.   Musculoskeletal:  Negative for arthralgias, gait problem and myalgias.   Integumentary:  Negative for color change, rash and wound.   Allergic/Immunologic: Negative for environmental allergies and food allergies.   Neurological:  Positive for light-headedness. Negative for dizziness, weakness and headaches.   Psychiatric/Behavioral:  Negative for behavioral problems and sleep disturbance.        Medical / Social / Family History     Past Medical History:   Diagnosis Date    Adult osteomalacia     Allergic rhinitis     Hyperlipidemia     Myalgia     Vitamin D deficiency        History reviewed. No pertinent surgical history.    Medications and Allergies     Medications  Outpatient Medications Marked as Taking for the 8/27/24 encounter (Office Visit) with Celine Avilez NP   Medication Sig Dispense Refill    cholecalciferol, vitamin D3, (VITAMIN D3) 50 mcg (2,000 unit) Cap Take 2,000 Units by mouth once daily.      clobetasoL (TEMOVATE) 0.05 % cream Apply topically 2 (two) times daily. 30 g 0    fluticasone propionate (FLONASE) 50 mcg/actuation nasal spray 1 spray (50 mcg total) by Each Nostril route once daily. 16 mL 1    hydroCHLOROthiazide (HYDRODIURIL) 25 MG tablet Take 1 tablet (25 mg total) by mouth once daily. 90 tablet 1    ipratropium (ATROVENT) 42 mcg (0.06 %) nasal spray SPRAY 2 SPRAYS in each nostril THREE TIMES DAILY. FOR 30 DAYS THEN USE AS NEEDED FOR NASAL DRAINAGE OR SNEEZING      levothyroxine (SYNTHROID) 50 MCG tablet Take 2 Tablets Wednesday and Saturday  Take 1 Tablet Every Other Day. 114  tablet 1    meloxicam (MOBIC) 7.5 MG tablet Take 1 tablet (7.5 mg total) by mouth once daily. 30 tablet 1    olopatadine (PATANOL) 0.1 % ophthalmic solution Place 1 drop into both eyes 2 (two) times daily. 5 mL 1    [DISCONTINUED] losartan (COZAAR) 50 MG tablet Take 1 tablet (50 mg total) by mouth once daily. 90 tablet 1       Allergies  Review of patient's allergies indicates:  No Known Allergies    Physical Examination     Vitals:    08/27/24 1508   BP: 105/70   Pulse:    Resp:    Temp:      Physical Exam  Vitals and nursing note reviewed.   HENT:      Head: Normocephalic.      Nose: Nose normal.      Mouth/Throat:      Mouth: Mucous membranes are moist.      Pharynx: Oropharynx is clear. No posterior oropharyngeal erythema.   Eyes:      Conjunctiva/sclera: Conjunctivae normal.   Cardiovascular:      Rate and Rhythm: Normal rate and regular rhythm.      Pulses: Normal pulses.      Heart sounds: Normal heart sounds.   Pulmonary:      Effort: Pulmonary effort is normal.      Breath sounds: Normal breath sounds.   Abdominal:      General: Abdomen is flat. Bowel sounds are normal. There is no distension.      Palpations: Abdomen is soft.   Musculoskeletal:         General: No swelling or tenderness. Normal range of motion.      Cervical back: Normal range of motion.      Right lower leg: No edema.      Left lower leg: No edema.   Skin:     General: Skin is warm and dry.      Capillary Refill: Capillary refill takes less than 2 seconds.   Neurological:      Mental Status: He is alert. Mental status is at baseline.   Psychiatric:         Mood and Affect: Mood normal.         Behavior: Behavior normal.               Lab Results   Component Value Date    WBC 5.57 08/21/2024    HGB 15.7 08/21/2024    HCT 49.1 08/21/2024    MCV 96.8 (H) 08/21/2024     (L) 08/21/2024        CMP  Sodium   Date Value Ref Range Status   08/21/2024 138 136 - 145 mmol/L Final     Potassium   Date Value Ref Range Status   08/21/2024 4.3 3.5  - 5.1 mmol/L Final     Chloride   Date Value Ref Range Status   08/21/2024 105 98 - 107 mmol/L Final     CO2   Date Value Ref Range Status   08/21/2024 25 21 - 32 mmol/L Final     Glucose   Date Value Ref Range Status   08/21/2024 143 (H) 74 - 106 mg/dL Final     BUN   Date Value Ref Range Status   08/21/2024 23 (H) 7 - 18 mg/dL Final     Creatinine   Date Value Ref Range Status   08/21/2024 1.12 0.70 - 1.30 mg/dL Final     Calcium   Date Value Ref Range Status   08/21/2024 8.6 8.5 - 10.1 mg/dL Final     Total Protein   Date Value Ref Range Status   08/21/2024 7.0 6.4 - 8.2 g/dL Final     Albumin   Date Value Ref Range Status   08/21/2024 4.0 3.5 - 5.0 g/dL Final     Bilirubin, Total   Date Value Ref Range Status   08/21/2024 1.0 >0.0 - 1.2 mg/dL Final     Alk Phos   Date Value Ref Range Status   08/21/2024 75 45 - 115 U/L Final     AST   Date Value Ref Range Status   08/21/2024 33 15 - 37 U/L Final     ALT   Date Value Ref Range Status   08/21/2024 39 16 - 61 U/L Final     Anion Gap   Date Value Ref Range Status   08/21/2024 12 7 - 16 mmol/L Final     eGFR   Date Value Ref Range Status   08/21/2024 63 >=60 mL/min/1.73m2 Final     Procedures   Assessment and Plan (including Health Maintenance)   :    Plan:     Problem List Items Addressed This Visit          Cardiac/Vascular    Essential hypertension, benign    Current Assessment & Plan     Having some hypotension. Decrease Losartan to 25mg daily. He has just picked up refill so instructed him to take half tab of Losartan 50mg daily. Increase fluids. Continue to monitor BP and keep log. Follow up in 3 months or as needed.          Relevant Medications    losartan (COZAAR) 50 MG tablet       Health Maintenance Topics with due status: Not Due       Topic Last Completion Date    Influenza Vaccine 12/28/2022    Hemoglobin A1c (Prediabetes) 08/21/2024    Lipid Panel 08/21/2024       No future appointments.     Health Maintenance Due   Topic Date Due    TETANUS VACCINE   Never done    Shingles Vaccine (1 of 2) Never done    RSV Vaccine (Age 60+ and Pregnant patients) (1 - 1-dose 60+ series) Never done    Pneumococcal Vaccines (Age 65+) (2 of 2 - PCV) 10/10/2014    COVID-19 Vaccine (1 - 2023-24 season) Never done          Signature:  Celine Avilez NP  [address]  [address]    Date of encounter: 8/27/24

## 2024-10-08 DIAGNOSIS — I10 ESSENTIAL HYPERTENSION, BENIGN: ICD-10-CM

## 2024-10-08 RX ORDER — HYDROCHLOROTHIAZIDE 25 MG/1
25 TABLET ORAL DAILY
Qty: 90 TABLET | Refills: 1 | Status: SHIPPED | OUTPATIENT
Start: 2024-10-08 | End: 2024-10-10 | Stop reason: SDUPTHER

## 2024-10-08 RX ORDER — LOSARTAN POTASSIUM 50 MG/1
25 TABLET ORAL DAILY
Qty: 45 TABLET | Refills: 1 | Status: SHIPPED | OUTPATIENT
Start: 2024-10-08 | End: 2024-10-10 | Stop reason: SDUPTHER

## 2024-10-08 NOTE — TELEPHONE ENCOUNTER
Pt called and stated that his meds were supposed to go to [facility] in [place] and they were not. Please send them there. Thank you.

## 2024-10-10 DIAGNOSIS — I10 ESSENTIAL HYPERTENSION, BENIGN: ICD-10-CM

## 2024-10-10 RX ORDER — LOSARTAN POTASSIUM 50 MG/1
25 TABLET ORAL DAILY
Qty: 45 TABLET | Refills: 1 | Status: SHIPPED | OUTPATIENT
Start: 2024-10-10 | End: 2025-04-08

## 2024-10-10 RX ORDER — HYDROCHLOROTHIAZIDE 25 MG/1
25 TABLET ORAL DAILY
Qty: 90 TABLET | Refills: 1 | Status: SHIPPED | OUTPATIENT
Start: 2024-10-10

## 2024-10-10 NOTE — TELEPHONE ENCOUNTER
Pt needs meds sent to [facility] in [place]. We went it earlier this week but it some how got sent to [facility] again.

## 2024-11-05 LAB
LEFT EYE DM RETINOPATHY: NEGATIVE
RIGHT EYE DM RETINOPATHY: NEGATIVE

## 2024-12-17 ENCOUNTER — OFFICE VISIT (OUTPATIENT)
Dept: FAMILY MEDICINE | Facility: CLINIC | Age: 89
End: 2024-12-17
Payer: MEDICARE

## 2024-12-17 VITALS
OXYGEN SATURATION: 94 % | HEIGHT: 75 IN | HEART RATE: 87 BPM | TEMPERATURE: 99 F | WEIGHT: 216.38 LBS | SYSTOLIC BLOOD PRESSURE: 122 MMHG | BODY MASS INDEX: 26.9 KG/M2 | DIASTOLIC BLOOD PRESSURE: 80 MMHG | RESPIRATION RATE: 18 BRPM

## 2024-12-17 DIAGNOSIS — E03.9 HYPOTHYROIDISM, UNSPECIFIED TYPE: ICD-10-CM

## 2024-12-17 DIAGNOSIS — I10 ESSENTIAL HYPERTENSION, BENIGN: Primary | ICD-10-CM

## 2024-12-17 DIAGNOSIS — I10 ESSENTIAL HYPERTENSION, BENIGN: ICD-10-CM

## 2024-12-17 DIAGNOSIS — Z13.1 SCREENING FOR DIABETES MELLITUS: ICD-10-CM

## 2024-12-17 DIAGNOSIS — R79.9 ABNORMAL FINDING OF BLOOD CHEMISTRY, UNSPECIFIED: ICD-10-CM

## 2024-12-17 DIAGNOSIS — E78.2 MIXED HYPERLIPIDEMIA: ICD-10-CM

## 2024-12-17 DIAGNOSIS — N48.1 BALANITIS: ICD-10-CM

## 2024-12-17 LAB
ALBUMIN SERPL BCP-MCNC: 4 G/DL (ref 3.4–4.8)
ALBUMIN/GLOB SERPL: 1.3 {RATIO}
ALP SERPL-CCNC: 70 U/L (ref 40–150)
ALT SERPL W P-5'-P-CCNC: 30 U/L
ANION GAP SERPL CALCULATED.3IONS-SCNC: 15 MMOL/L (ref 7–16)
AST SERPL W P-5'-P-CCNC: 40 U/L (ref 5–34)
BASOPHILS # BLD AUTO: 0.05 K/UL (ref 0–0.2)
BASOPHILS NFR BLD AUTO: 0.5 % (ref 0–1)
BILIRUB SERPL-MCNC: 1.3 MG/DL
BUN SERPL-MCNC: 28 MG/DL (ref 8–26)
BUN/CREAT SERPL: 23 (ref 6–20)
CALCIUM SERPL-MCNC: 9.5 MG/DL (ref 8.8–10)
CHLORIDE SERPL-SCNC: 99 MMOL/L (ref 98–107)
CHOLEST SERPL-MCNC: 247 MG/DL
CHOLEST/HDLC SERPL: 5.1 {RATIO}
CO2 SERPL-SCNC: 26 MMOL/L (ref 23–31)
CREAT SERPL-MCNC: 1.2 MG/DL (ref 0.72–1.25)
DIFFERENTIAL METHOD BLD: ABNORMAL
EGFR (NO RACE VARIABLE) (RUSH/TITUS): 58 ML/MIN/1.73M2
EOSINOPHIL # BLD AUTO: 0.09 K/UL (ref 0–0.5)
EOSINOPHIL NFR BLD AUTO: 0.9 % (ref 1–4)
ERYTHROCYTE [DISTWIDTH] IN BLOOD BY AUTOMATED COUNT: 12.8 % (ref 11.5–14.5)
EST. AVERAGE GLUCOSE BLD GHB EST-MCNC: 131 MG/DL
GLOBULIN SER-MCNC: 3.2 G/DL (ref 2–4)
GLUCOSE SERPL-MCNC: 141 MG/DL (ref 82–115)
HBA1C MFR BLD HPLC: 6.2 %
HCT VFR BLD AUTO: 48.8 % (ref 40–54)
HDLC SERPL-MCNC: 48 MG/DL (ref 35–60)
HGB BLD-MCNC: 15.7 G/DL (ref 13.5–18)
IMM GRANULOCYTES # BLD AUTO: 0.05 K/UL (ref 0–0.04)
IMM GRANULOCYTES NFR BLD: 0.5 % (ref 0–0.4)
LDLC SERPL CALC-MCNC: 175 MG/DL
LDLC/HDLC SERPL: 3.6 {RATIO}
LYMPHOCYTES # BLD AUTO: 1.1 K/UL (ref 1–4.8)
LYMPHOCYTES NFR BLD AUTO: 11.4 % (ref 27–41)
MAGNESIUM SERPL-MCNC: 2.2 MG/DL (ref 1.6–2.6)
MCH RBC QN AUTO: 30.7 PG (ref 27–31)
MCHC RBC AUTO-ENTMCNC: 32.2 G/DL (ref 32–36)
MCV RBC AUTO: 95.5 FL (ref 80–96)
MONOCYTES # BLD AUTO: 0.66 K/UL (ref 0–0.8)
MONOCYTES NFR BLD AUTO: 6.8 % (ref 2–6)
MPC BLD CALC-MCNC: 10.5 FL (ref 9.4–12.4)
NEUTROPHILS # BLD AUTO: 7.71 K/UL (ref 1.8–7.7)
NEUTROPHILS NFR BLD AUTO: 79.9 % (ref 53–65)
NONHDLC SERPL-MCNC: 199 MG/DL
NRBC # BLD AUTO: 0 X10E3/UL
NRBC, AUTO (.00): 0 %
PLATELET # BLD AUTO: 145 K/UL (ref 150–400)
POTASSIUM SERPL-SCNC: 4.5 MMOL/L (ref 3.5–5.1)
PROT SERPL-MCNC: 7.2 G/DL (ref 5.8–7.6)
RBC # BLD AUTO: 5.11 M/UL (ref 4.6–6.2)
SODIUM SERPL-SCNC: 135 MMOL/L (ref 136–145)
TRIGL SERPL-MCNC: 118 MG/DL (ref 34–140)
TSH SERPL DL<=0.005 MIU/L-ACNC: 1.61 UIU/ML (ref 0.35–4.94)
VLDLC SERPL-MCNC: 24 MG/DL
WBC # BLD AUTO: 9.66 K/UL (ref 4.5–11)

## 2024-12-17 PROCEDURE — 99214 OFFICE O/P EST MOD 30 MIN: CPT | Mod: ,,, | Performed by: NURSE PRACTITIONER

## 2024-12-17 PROCEDURE — 80061 LIPID PANEL: CPT | Mod: ,,, | Performed by: CLINICAL MEDICAL LABORATORY

## 2024-12-17 PROCEDURE — 83735 ASSAY OF MAGNESIUM: CPT | Mod: ,,, | Performed by: CLINICAL MEDICAL LABORATORY

## 2024-12-17 PROCEDURE — 85025 COMPLETE CBC W/AUTO DIFF WBC: CPT | Mod: ,,, | Performed by: CLINICAL MEDICAL LABORATORY

## 2024-12-17 PROCEDURE — 84443 ASSAY THYROID STIM HORMONE: CPT | Mod: ,,, | Performed by: CLINICAL MEDICAL LABORATORY

## 2024-12-17 PROCEDURE — 83036 HEMOGLOBIN GLYCOSYLATED A1C: CPT | Mod: ,,, | Performed by: CLINICAL MEDICAL LABORATORY

## 2024-12-17 PROCEDURE — 80053 COMPREHEN METABOLIC PANEL: CPT | Mod: ,,, | Performed by: CLINICAL MEDICAL LABORATORY

## 2024-12-17 RX ORDER — HYDROCHLOROTHIAZIDE 25 MG/1
25 TABLET ORAL DAILY
Qty: 90 TABLET | Refills: 1 | Status: SHIPPED | OUTPATIENT
Start: 2024-12-17

## 2024-12-17 RX ORDER — MUPIROCIN 20 MG/G
OINTMENT TOPICAL 2 TIMES DAILY
Qty: 30 G | Refills: 1 | Status: SHIPPED | OUTPATIENT
Start: 2024-12-17

## 2024-12-17 RX ORDER — LEVOTHYROXINE SODIUM 50 UG/1
TABLET ORAL
Qty: 114 TABLET | Refills: 1 | Status: SHIPPED | OUTPATIENT
Start: 2024-12-17

## 2024-12-17 RX ORDER — TRIAMCINOLONE ACETONIDE 1 MG/G
CREAM TOPICAL
COMMUNITY
Start: 2024-09-05

## 2024-12-17 RX ORDER — LOSARTAN POTASSIUM 25 MG/1
25 TABLET ORAL DAILY
Qty: 90 TABLET | Refills: 1 | Status: SHIPPED | OUTPATIENT
Start: 2024-12-17 | End: 2025-12-17

## 2024-12-17 RX ORDER — LEVOTHYROXINE SODIUM 50 UG/1
TABLET ORAL
Qty: 114 TABLET | Refills: 1 | Status: SHIPPED | OUTPATIENT
Start: 2024-12-17 | End: 2024-12-17

## 2024-12-17 RX ORDER — LOSARTAN POTASSIUM 50 MG/1
25 TABLET ORAL DAILY
Qty: 45 TABLET | Refills: 1 | Status: SHIPPED | OUTPATIENT
Start: 2024-12-17 | End: 2025-06-15

## 2024-12-17 NOTE — PROGRESS NOTES
Celine Avilez NP   [facility]  [id number] [place] [address]  [phone]      PATIENT NAME: Jan Stewart  : 1935  DATE: 24  MRN: [id number]      Billing Provider: Celine Avilez NP  Level of Service: NC OFFICE/OUTPT VISIT, EST, LEVL IV, 30-39 MIN  Patient PCP Information       Provider PCP Type    Celine Avilez NP General                Medications and Allergies     Medications  Outpatient Medications Marked as Taking for the 24 encounter (Office Visit) with Celine Avilez NP   Medication Sig Dispense Refill    cholecalciferol, vitamin D3, (VITAMIN D3) 50 mcg (2,000 unit) Cap Take 2,000 Units by mouth once daily.      clobetasoL (TEMOVATE) 0.05 % cream Apply topically 2 (two) times daily. 30 g 0    fluticasone propionate (FLONASE) 50 mcg/actuation nasal spray 1 spray (50 mcg total) by Each Nostril route once daily. 16 mL 1    ipratropium (ATROVENT) 42 mcg (0.06 %) nasal spray SPRAY 2 SPRAYS in each nostril THREE TIMES DAILY. FOR 30 DAYS THEN USE AS NEEDED FOR NASAL DRAINAGE OR SNEEZING      meloxicam (MOBIC) 7.5 MG tablet Take 1 tablet (7.5 mg total) by mouth once daily. 30 tablet 1    nitroGLYCERIN (NITROSTAT) 0.4 MG SL tablet Place 1 tablet (0.4 mg total) under the tongue every 5 (five) minutes as needed for Chest pain. May Repeat Every 5 Minutes. Seek Medical Attention If Pain Persists After 3 Tablets. 10 tablet 1    olopatadine (PATANOL) 0.1 % ophthalmic solution Place 1 drop into both eyes 2 (two) times daily. 5 mL 1    triamcinolone acetonide 0.1% (KENALOG) 0.1 % cream SMARTSIG:sparingly Topical Twice Daily      [DISCONTINUED] hydroCHLOROthiazide (HYDRODIURIL) 25 MG tablet Take 1 tablet (25 mg total) by mouth once daily. 90 tablet 1    [DISCONTINUED] levothyroxine (SYNTHROID) 50 MCG tablet Take 2 Tablets Wednesday and Saturday  Take 1 Tablet Every Other Day. 114 tablet 1    [DISCONTINUED] losartan (COZAAR) 50 MG tablet Take 0.5  tablets (25 mg total) by mouth once daily. 45 tablet 1       Allergies  Review of patient's allergies indicates:  No Known Allergies    History of Present Illness    CHIEF COMPLAINT:  Patient presents today for follow up and medication refills.    GENITAL CONCERNS:  He reports genital area is pink, irritated, and swollen. Previous trials of clotrimazole-miconazole and hydrocortisone have not provided improvement.    SURGICAL HISTORY:  He underwent bilateral cataract surgeries in the last month and did well.       ROS:  General: -fever, -chills, -fatigue, -weight gain, -weight loss  Eyes: -vision changes, -redness, -discharge  ENT: -ear pain, -nasal congestion, -sore throat  Cardiovascular: -chest pain, -palpitations, -lower extremity edema  Respiratory: -cough, -shortness of breath  Gastrointestinal: -abdominal pain, -nausea, -vomiting, -diarrhea, -constipation, -blood in stool  Genitourinary: -dysuria, -hematuria, -frequency. +redness and swelling to foreskin  Musculoskeletal: -joint pain, -muscle pain  Skin: -rash, -lesion, -itching  Neurological: -headache, -dizziness, -numbness, -tingling  Psychiatric: -anxiety, -depression, -sleep difficulty          Physical Exam    Vitals: Blood pressure: 122/80.  General: No acute distress. Well-developed. Well-nourished.  Eyes: EOMI. Sclerae anicteric.  HENT: Normocephalic. Atraumatic. Nares patent. Moist oral mucosa.  Ears: Bilateral TMs clear. Bilateral EACs clear.  Cardiovascular: Regular rate. Regular rhythm. No murmurs. No rubs. No gallops. Normal S1, S2.  Respiratory: Normal respiratory effort. Clear to auscultation bilaterally. No rales. No rhonchi. No wheezing.  Abdomen: Soft. Non-tender. Non-distended. Normoactive bowel sounds.  Musculoskeletal: No  obvious deformity.  Extremities: No lower extremity edema.  Neurological: Alert & oriented x3. No slurred speech. Normal gait.  Psychiatric: Normal mood. Normal affect. Good insight. Good judgment.  Skin: Warm. Dry.  Declines  exam but reports redness and swelling to foreskin of penis.          Assessment & Plan    IMPRESSION:  - Assessed cataract surgery outcomes as successful  - Evaluated genital irritation, suspecting possible bacterial cause after unsuccessful antifungal treatment  - Opted for topical antibiotic treatment before considering oral antibiotics to minimize systemic effects    HYPERTENSION:  - Measured blood pressure at 122/80.  - Assessed that current treatment is effective in controlling blood pressure.  - Continued losartan 25 mg daily for blood pressure control.  - Continued hydrochlorothiazide 25 mg daily for blood pressure control.    HYPERLIPIDEMIA:  - Ordered lipid profile.    THYROID DISORDER:  - Continued levothyroxine 50 mc tablets on Wednesday and Saturday, 1 tablet on other days.  -TSH obtained today.    GENITAL IRRITATION:  - Patient reports irritation and puffiness of the prepuce.  - Declines  exam but reports the area to be pink, irritated, and swollen.  - Assessed that the condition may be bacterial rather than fungal as he has been using antifungal cream for about a week now and symptoms have not improved.   - Instructed the patient to wash the genital area thoroughly with mild soap (e.g., baby shampoo or Dove).  - Advised the patient to pat dry the genital area completely after washing.  - Prescribed topical antibiotic ointment for genital irritation, to be applied as directed.  - Instructed the patient to contact the office by Friday if genital irritation does not improve with topical antibiotic treatment.  - Noted that oral antibiotics may be considered if no improvement is seen by Friday.    LABS:  - Blood glucose test ordered due to patient's report of glucose running around 150-160 at home.    FOLLOW UP:  - Follow up  in 3 months or as needed.          Health Maintenance Due   Topic Date Due    TETANUS VACCINE  Never done    Shingles Vaccine (1 of 2) Never done    RSV Vaccine (Age  60+ and Pregnant patients) (1 - 1-dose 75+ series) Never done    Pneumococcal Vaccines (Age 65+) (2 of 2 - PCV) 10/10/2014    COVID-19 Vaccine (1 - 2024-25 season) Never done       Problem List Items Addressed This Visit          Cardiac/Vascular    Essential hypertension, benign - Primary    Relevant Medications    losartan (COZAAR) 50 MG tablet    hydroCHLOROthiazide (HYDRODIURIL) 25 MG tablet    Other Relevant Orders    CBC Auto Differential    Magnesium    Hyperlipidemia    Relevant Orders    Lipid Panel    Comprehensive Metabolic Panel       Renal/    Balanitis       Endocrine    Hypothyroidism    Relevant Medications    levothyroxine (SYNTHROID) 50 MCG tablet    Other Relevant Orders    TSH     Other Visit Diagnoses       Screening for diabetes mellitus        Relevant Orders    Hemoglobin A1C    Abnormal finding of blood chemistry, unspecified        Relevant Orders    Hemoglobin A1C            Health Maintenance Topics with due status: Not Due       Topic Last Completion Date    Hemoglobin A1c (Prediabetes) 08/21/2024    Lipid Panel 08/21/2024       Future Appointments   Date Time Provider Department Center   4/17/2025 10:20 AM Celine Avilez NP [facility]        This note was generated with the assistance of ambient listening technology. Verbal consent was obtained by the patient and accompanying visitor(s) for the recording of patient appointment to facilitate this note. I attest to having reviewed and edited the generated note for accuracy, though some syntax or spelling errors may persist. Please contact the author of this note for any clarification.     Signature:  Celine Avilez NP  [facility]  [address] [address] [address]  [phone]    Date of encounter: 12/17/24

## 2024-12-18 ENCOUNTER — TELEPHONE (OUTPATIENT)
Dept: FAMILY MEDICINE | Facility: CLINIC | Age: 89
End: 2024-12-18
Payer: MEDICARE

## 2024-12-18 NOTE — PROGRESS NOTES
Labs reviewed: Total cholesterol and LDL were elevated, higher than they were 3 months ago. Try to work harder on low fat/low cholesterol diet. Continue the Fish Oil supplementation. CBC normal or clinically insignificant. Sodium slightly low, increase in diet. Kidney function levels a little elevated, increase water intake.   TSH normal, continue current dosage of Levothyroxine. Glucose was 141 at time of lab draw and Hgb A1C was 6.2 which is considered prediabetic level, try to decrease carbs in diet and increase exercise.   I think he likes a printed copy of his labs to be mailed to him.

## 2024-12-18 NOTE — TELEPHONE ENCOUNTER
Pt returned call, reviewed lab results.  Total cholesterol & LDL elevated, higher than they were 3 months ago, instructed to work harder on low fat/low cholesterol diet, continue fish oil supplementation.  CBC normal or clinically insignificant.  Sodium slightly low, increase in diet. Kidney fx a little elevated, increase water intake.  TSH normal, continue current dosage of levothyroxine. Glucose 141, A1C 6.2 (prediabetic) try to decrease carbs in diet & increase exercise.  Pt verbalized understanding.  Mailing out lab results per pt request.

## 2024-12-19 ENCOUNTER — TELEPHONE (OUTPATIENT)
Dept: FAMILY MEDICINE | Facility: CLINIC | Age: 89
End: 2024-12-19
Payer: MEDICARE

## 2024-12-19 NOTE — TELEPHONE ENCOUNTER
I spoke with patient. Reassured him that redness to lower abdomen is most likely not allergic reaction. Explained to patient he may use mupirocin ointment to red area since the ointment is working on genital area. If symptoms worsen patient is to call the clinic. Patient instructed to call clinic tomorrow just to let us know how he is doing. No oral medications at this time due to side effects of GI distress. Patient is in agreement with plan.

## 2024-12-19 NOTE — TELEPHONE ENCOUNTER
"Patient called reporting " allergic reaction to antibiotic". Spoke with patient. He reports he has been using mupirocin ointment to genital area as directed. This area has improved with use of ointment. Patient has now developed red area to lower abdomen since yesterday. The area of redness is 1/4 baseball size per patient. He is concerned this is allergic reaction to medication. He stated an oral medication was discussed during his last office visit.Please advise.  "

## 2024-12-31 ENCOUNTER — OFFICE VISIT (OUTPATIENT)
Dept: FAMILY MEDICINE | Facility: CLINIC | Age: 89
End: 2024-12-31
Payer: MEDICARE

## 2024-12-31 VITALS
TEMPERATURE: 97 F | DIASTOLIC BLOOD PRESSURE: 88 MMHG | OXYGEN SATURATION: 97 % | SYSTOLIC BLOOD PRESSURE: 148 MMHG | RESPIRATION RATE: 20 BRPM | HEART RATE: 95 BPM | HEIGHT: 75 IN | BODY MASS INDEX: 26.76 KG/M2 | WEIGHT: 215.19 LBS

## 2024-12-31 DIAGNOSIS — N47.1 PHIMOSIS OF PENIS: Primary | ICD-10-CM

## 2024-12-31 PROCEDURE — 99213 OFFICE O/P EST LOW 20 MIN: CPT | Mod: ,,, | Performed by: NURSE PRACTITIONER

## 2024-12-31 RX ORDER — BETAMETHASONE DIPROPIONATE 0.5 MG/G
CREAM TOPICAL 2 TIMES DAILY
Qty: 45 G | Refills: 2 | Status: SHIPPED | OUTPATIENT
Start: 2024-12-31

## 2024-12-31 RX ORDER — MUPIROCIN 20 MG/G
OINTMENT TOPICAL 2 TIMES DAILY
Qty: 30 G | Refills: 2 | Status: SHIPPED | OUTPATIENT
Start: 2024-12-31

## 2024-12-31 RX ORDER — DOXYLAMINE SUCCINATE 25 MG
TABLET ORAL 2 TIMES DAILY
Qty: 56.7 G | Refills: 0 | Status: CANCELLED | OUTPATIENT
Start: 2024-12-31 | End: 2025-01-10

## 2024-12-31 RX ORDER — KETOTIFEN FUMARATE 0.35 MG/ML
1 SOLUTION/ DROPS OPHTHALMIC 2 TIMES DAILY
COMMUNITY

## 2024-12-31 NOTE — Clinical Note
Patient reports he has just recently had cataract surgery bilateral eyes at [place] [facility] (Dr. Igor Antunez), [facility] in [place], please obtain records.

## 2024-12-31 NOTE — ASSESSMENT & PLAN NOTE
Declines  exam but reports he is having issues retracting the foreskin on his penis and has had this issue in the past. He is requesting betamethasone which he has used in the past.

## 2024-12-31 NOTE — PROGRESS NOTES
Celine Avilez NP   [place] [address] [facility] [address]  [place]  [address]      PATIENT NAME: Jan Stewart  : 1935  DATE: 24  MRN: [id number]      Billing Provider: Celine Avilez NP  Level of Service: MT OFFICE/OUTPT VISIT, EST, LEVL III, 20-29 MIN  Patient PCP Information       Provider PCP Type    Celine Avilez NP General            Reason for Visit / Chief Complaint: Follow-up (Patient is an 89 year old male who presents to the clinic related to 1 week follow up related rash in genital area & lower abdominal area, patient has been using ointment & reports it has improved. ) and Groin Swelling (Patient reports he has swelling, redness penile area x 1 month, swelling improved.  Patient is requesting an ointment to soften the foreskin area so that he can pull back and clean daily, since irritation, patient reports there was a hard ring around the tip, but has improved.  Patient thinks he may have Balanitis/Phimosis (he has been researching). )         History of Present Illness / Problem Focused Workflow     89 year old male presents to clinic for follow up related rash in genital area & lower abdominal area, patient has been using ointment & reports it has improved. He has had a history of similar issues and states in the past he has been given medication to thin the foreskin out and make it more stretchable so that he can adequately clean.             Review of Systems     @Review of Systems   Constitutional:  Negative for activity change, appetite change, fatigue and fever.   HENT:  Negative for nasal congestion, ear pain, rhinorrhea, sinus pressure/congestion and sore throat.    Eyes:  Negative for pain, redness, visual disturbance and eye dryness.   Respiratory:  Negative for cough and shortness of breath.    Cardiovascular:  Negative for chest pain and leg swelling.   Gastrointestinal:  Negative for abdominal distention, abdominal pain, constipation and diarrhea.   Endocrine:  Negative for cold intolerance, heat intolerance and polyuria.   Genitourinary:  Negative for bladder incontinence, dysuria, frequency and urgency.        Reports he is having difficulty retracting foreskin   Musculoskeletal:  Negative for arthralgias, gait problem and myalgias.   Integumentary:  Negative for color change, rash and wound.   Allergic/Immunologic: Negative for environmental allergies and food allergies.   Neurological:  Negative for dizziness, weakness, light-headedness and headaches.   Psychiatric/Behavioral:  Negative for behavioral problems and sleep disturbance.        Medical / Social / Family History     Past Medical History:   Diagnosis Date    Adult osteomalacia     Allergic rhinitis     Hyperlipidemia     Myalgia     Vitamin D deficiency        Past Surgical History:   Procedure Laterality Date    cataract surgery Bilateral 11/2024    [facility] in [place] Dr. Igor Antunez       Medications and Allergies     Medications  Outpatient Medications Marked as Taking for the 12/31/24 encounter (Office Visit) with Celine Avilez NP   Medication Sig Dispense Refill    cholecalciferol, vitamin D3, (VITAMIN D3) 50 mcg (2,000 unit) Cap Take 2,000 Units by mouth once daily.      clobetasoL (TEMOVATE) 0.05 % cream Apply topically 2 (two) times daily. 30 g 0    hydroCHLOROthiazide (HYDRODIURIL) 25 MG tablet Take 1 tablet (25 mg total) by mouth once daily. 90 tablet 1    levothyroxine (SYNTHROID) 50 MCG tablet Take 2 Tablets Wednesday and Saturday  Take 1 Tablet Every Other Day. 114 tablet 1    losartan (COZAAR) 25 MG tablet Take 1 tablet (25 mg total) by mouth once daily. 90 tablet 1    nitroGLYCERIN (NITROSTAT) 0.4 MG SL tablet Place 1 tablet (0.4 mg total) under the tongue every 5 (five) minutes as needed for Chest pain. May Repeat Every 5 Minutes. Seek Medical Attention If Pain Persists After 3 Tablets. 10 tablet 1    triamcinolone acetonide 0.1% (KENALOG) 0.1 % cream As needed          Allergies  Review of patient's allergies indicates:  No Known Allergies    Physical Examination     Vitals:    12/31/24 1006   BP: (!) 148/88   Pulse: 95   Resp: 20   Temp: 97.4 °F (36.3 °C)     Physical Exam  Vitals and nursing note reviewed.   HENT:      Head: Normocephalic.      Right Ear: Tympanic membrane normal.      Left Ear: Tympanic membrane normal.      Nose: Nose normal.      Mouth/Throat:      Mouth: Mucous membranes are moist.      Pharynx: Oropharynx is clear. No posterior oropharyngeal erythema.   Eyes:      Conjunctiva/sclera: Conjunctivae normal.   Cardiovascular:      Rate and Rhythm: Normal rate and regular rhythm.      Pulses: Normal pulses.      Heart sounds: Normal heart sounds.   Pulmonary:      Effort: Pulmonary effort is normal.      Breath sounds: Normal breath sounds.   Abdominal:      General: Abdomen is flat. Bowel sounds are normal. There is no distension.      Palpations: Abdomen is soft.   Genitourinary:     Comments: Declines  exam  Musculoskeletal:         General: No swelling or tenderness. Normal range of motion.      Cervical back: Normal range of motion.      Right lower leg: No edema.      Left lower leg: No edema.   Skin:     General: Skin is warm and dry.      Capillary Refill: Capillary refill takes less than 2 seconds.   Neurological:      Mental Status: He is alert. Mental status is at baseline.   Psychiatric:         Mood and Affect: Mood normal.         Behavior: Behavior normal.               Lab Results   Component Value Date    WBC 9.66 12/17/2024    HGB 15.7 12/17/2024    HCT 48.8 12/17/2024    MCV 95.5 12/17/2024     (L) 12/17/2024        CMP  Sodium   Date Value Ref Range Status   12/17/2024 135 (L) 136 - 145 mmol/L Final     Potassium   Date Value Ref Range Status   12/17/2024 4.5 3.5 - 5.1 mmol/L Final     Chloride   Date Value Ref Range Status   12/17/2024 99 98 - 107 mmol/L Final     CO2   Date Value Ref Range Status   12/17/2024 26 23 - 31  mmol/L Final     Glucose   Date Value Ref Range Status   12/17/2024 141 (H) 82 - 115 mg/dL Final     BUN   Date Value Ref Range Status   12/17/2024 28 (H) 8 - 26 mg/dL Final     Creatinine   Date Value Ref Range Status   12/17/2024 1.20 0.72 - 1.25 mg/dL Final     Calcium   Date Value Ref Range Status   12/17/2024 9.5 8.8 - 10.0 mg/dL Final     Total Protein   Date Value Ref Range Status   12/17/2024 7.2 5.8 - 7.6 g/dL Final     Albumin   Date Value Ref Range Status   12/17/2024 4.0 3.4 - 4.8 g/dL Final     Bilirubin, Total   Date Value Ref Range Status   12/17/2024 1.3 <=1.5 mg/dL Final     Alk Phos   Date Value Ref Range Status   12/17/2024 70 40 - 150 U/L Final     AST   Date Value Ref Range Status   12/17/2024 40 (H) 5 - 34 U/L Final     ALT   Date Value Ref Range Status   12/17/2024 30 <=55 U/L Final     Anion Gap   Date Value Ref Range Status   12/17/2024 15 7 - 16 mmol/L Final     eGFR   Date Value Ref Range Status   12/17/2024 58 (L) >=60 mL/min/1.73m2 Final     Procedures   Assessment and Plan (including Health Maintenance)   :    Plan:     Problem List Items Addressed This Visit          Renal/    Phimosis of penis - Primary    Current Assessment & Plan     Declines  exam but reports he is having issues retracting the foreskin on his penis and has had this issue in the past. He is requesting betamethasone which he has used in the past.             Health Maintenance Topics with due status: Not Due       Topic Last Completion Date    Hemoglobin A1c (Prediabetes) 12/17/2024    Lipid Panel 12/17/2024       Future Appointments   Date Time Provider Department Center   4/17/2025 10:20 AM Celine Avilez NP [facility]        Health Maintenance Due   Topic Date Due    TETANUS VACCINE  Never done    Shingles Vaccine (1 of 2) Never done    RSV Vaccine (Age 60+ and Pregnant patients) (1 - 1-dose 75+ series) Never done    Pneumococcal Vaccines (Age 50+) (2 of 2 - PCV) 10/10/2014    COVID-19  Vaccine (1 - 2024-25 season) Never done          Signature:  Celine Avilez NP  [place] [id number] [place]  [address]    Date of encounter: 12/31/24     Finasteride Male Counseling: Finasteride Counseling:  I discussed with the patient the risks of use of finasteride including but not limited to decreased libido, decreased ejaculate volume, gynecomastia, and depression. Women should not handle medication.  All of the patient's questions and concerns were addressed. Finasteride Counseling:  I discussed with the patient the risks of use of finasteride including but not limited to decreased libido, decreased ejaculate volume, gynecomastia, and depression. Women should not handle medication.  All of the patient's questions and concerns were addressed.

## 2024-12-31 NOTE — Clinical Note
Patient reports he has had skin lesions removed at Dermatologist/ Dr. Bailey Gotti, please obtain records.  Thanks!

## 2025-01-02 ENCOUNTER — PATIENT OUTREACH (OUTPATIENT)
Facility: HOSPITAL | Age: OVER 89
End: 2025-01-02
Payer: MEDICARE

## 2025-01-02 NOTE — LETTER
AUTHORIZATION FOR RELEASE OF   CONFIDENTIAL INFORMATION    Dear [facility],    We are seeing Jan Stewart, date of birth 1935, in the clinic at [facility]. Celine Avilez NP is the patient's PCP. Jan Stewart has an outstanding lab/procedure at the time we reviewed his chart. In order to help keep his health information updated, he has authorized us to request the following medical record(s):        (  )  MAMMOGRAM                                      (  )  COLONOSCOPY      (  )  PAP SMEAR                                          (  )  OUTSIDE LAB RESULTS     (  )  DEXA SCAN                                          (X)  EYE EXAM            (  )  FOOT EXAM                                          (  )  ENTIRE RECORD     (  )  OUTSIDE IMMUNIZATIONS                 (  )  _______________         Please fax records to [facility], Jess Smith, [phone].     If you have any questions, please contact [phone].          Patient Name: Jan Stewart  : 1935  Patient Phone #: [phone]

## 2025-01-02 NOTE — PROGRESS NOTES
01/02/2025   --Chart accessed for: Care Gaps  Care Everywhere updates requested and reviewed.  Immunization Database (Immprint/MIXX) reviewed.   Requested eye exam records from [facility]  Health Maintenance Due   Topic Date Due    TETANUS VACCINE  Never done    Shingles Vaccine (1 of 2) Never done    RSV Vaccine (Age 60+ and Pregnant patients) (1 - 1-dose 75+ series) Never done    Pneumococcal Vaccines (Age 50+) (2 of 2 - PCV) 10/10/2014    COVID-19 Vaccine (1 - 2024-25 season) Never done    Diabetes Urine Screening  Never done    Foot Exam  Never done    Eye Exam  01/02/2025

## 2025-01-03 DIAGNOSIS — E03.9 HYPOTHYROIDISM, UNSPECIFIED TYPE: ICD-10-CM

## 2025-01-03 RX ORDER — LEVOTHYROXINE SODIUM 50 UG/1
TABLET ORAL
Qty: 114 TABLET | Refills: 3 | Status: SHIPPED | OUTPATIENT
Start: 2025-01-03

## 2025-01-07 ENCOUNTER — PATIENT OUTREACH (OUTPATIENT)
Facility: HOSPITAL | Age: OVER 89
End: 2025-01-07
Payer: MEDICARE

## 2025-01-07 NOTE — PROGRESS NOTES
Population Health Chart Review & Patient Outreach Details    Updates Requested / Reviewed:      Health Maintenance Topics Addressed and Outreach Outcomes / Actions Taken:  Diabetic Eye Exam [x] HM updated diabetic eye exam. History updated.

## 2025-02-01 NOTE — PROGRESS NOTES
Celine Avilez NP   [facility]  [address] [address]  [place]  [address]      PATIENT NAME: Jan Stewart  : 1935  DATE: 22  MRN: [id number]      Billing Provider: Celine Avilez NP  Level of Service: NE OFFICE/OUTPT VISIT, EST, LEVL IV, 30-39 MIN  Patient PCP Information       Provider PCP Type    [facility]            Reason for Visit / Chief Complaint: Medication Refill       Update PCP  Update Chief Complaint         History of Present Illness / Problem Focused Workflow     Jan Stewart presents to the clinic with Medication Refill     87 year old male presents to clinic for medication refill and check up. He has PMH of Adult osteomalacia, Hyperlipidemia, Myalgia, and Vit D Deficiency. He states he has been doing well. He does report he has been more fatigued than usual. He states he realizes some of that is related to his age but would like to have his Vit B12 and Iron checked.       Review of Systems     @Review of Systems   Constitutional:  Positive for fatigue. Negative for activity change, appetite change and fever.   HENT:  Negative for nasal congestion, ear pain, rhinorrhea, sinus pressure/congestion and sore throat.    Eyes:  Negative for pain, redness, visual disturbance and eye dryness.   Respiratory:  Negative for cough and shortness of breath.    Cardiovascular:  Negative for chest pain and leg swelling.   Gastrointestinal:  Negative for abdominal distention, abdominal pain, constipation and diarrhea.   Endocrine: Negative for cold intolerance, heat intolerance and polyuria.   Genitourinary:  Negative for bladder incontinence, dysuria, frequency and urgency.   Musculoskeletal:  Negative for arthralgias, gait problem and myalgias.   Integumentary:  Negative for color change, rash and wound.   Allergic/Immunologic: Negative for environmental allergies and food allergies.   Neurological:  Negative for dizziness, weakness, light-headedness and headaches.    Psychiatric/Behavioral:  Negative for behavioral problems and sleep disturbance.      Medical / Social / Family History     Past Medical History:   Diagnosis Date    Adult osteomalacia     Hyperlipidemia     Myalgia     Vitamin D deficiency        History reviewed. No pertinent surgical history.    Social History    reports that he has never smoked. He has never been exposed to tobacco smoke. He has never used smokeless tobacco. He reports that he does not drink alcohol and does not use drugs.    Family History  's family history includes Cancer in his brother; Diabetes in his mother; Heart disease in his mother.    Medications and Allergies     Medications  Outpatient Medications Marked as Taking for the 12/28/22 encounter (Office Visit) with Celine Avilez NP   Medication Sig Dispense Refill    ascorbic acid, vitamin C, (VITAMIN C) 500 MG tablet Take 500 mg by mouth once daily.      cholecalciferol, vitamin D3, (VITAMIN D3) 50 mcg (2,000 unit) Cap Take 2,000 Units by mouth once daily.      omega-3 fatty acids/fish oil (FISH OIL-OMEGA-3 FATTY ACIDS) 300-1,000 mg capsule Take 1 capsule by mouth once daily.      [DISCONTINUED] hydroCHLOROthiazide (HYDRODIURIL) 25 MG tablet Take 1 tablet (25 mg total) by mouth once daily. 90 tablet 1    [DISCONTINUED] levothyroxine (SYNTHROID) 50 MCG tablet Take 2 Tablets Monday andWed and  Take 1 Tablet Every Other Day. 136 tablet 1    [DISCONTINUED] losartan (COZAAR) 50 MG tablet Take 1 tablet (50 mg total) by mouth once daily. 90 tablet 1    [DISCONTINUED] nitroGLYCERIN (NITROSTAT) 0.4 MG SL tablet Place 0.4 mg under the tongue every 5 (five) minutes as needed for Chest pain. May Repeat Every 5 Minutes. Seek Medical Attention If Pain Persists After 3 Tablets.         Allergies  Review of patient's allergies indicates:  No Known Allergies    Physical Examination     Vitals:    12/28/22 0849   BP: 132/78   Pulse: 80   Resp: 18   Temp: 97.5 °F (36.4 °C)     Physical  Exam  Vitals and nursing note reviewed.   HENT:      Head: Normocephalic.      Right Ear: Tympanic membrane normal.      Left Ear: Tympanic membrane normal.      Nose: Nose normal.      Mouth/Throat:      Mouth: Mucous membranes are moist.      Pharynx: Oropharynx is clear. No posterior oropharyngeal erythema.   Eyes:      Conjunctiva/sclera: Conjunctivae normal.   Cardiovascular:      Rate and Rhythm: Normal rate and regular rhythm.      Pulses: Normal pulses.      Heart sounds: Normal heart sounds.   Pulmonary:      Effort: Pulmonary effort is normal.      Breath sounds: Normal breath sounds.   Abdominal:      General: Abdomen is flat. Bowel sounds are normal. There is no distension.      Palpations: Abdomen is soft.   Musculoskeletal:         General: No swelling or tenderness. Normal range of motion.      Cervical back: Normal range of motion.      Right lower leg: No edema.      Left lower leg: No edema.   Skin:     General: Skin is warm and dry.      Capillary Refill: Capillary refill takes less than 2 seconds.   Neurological:      Mental Status: He is alert. Mental status is at baseline.   Psychiatric:         Mood and Affect: Mood normal.         Behavior: Behavior normal.             Lab Results   Component Value Date    WBC 5.81 06/13/2022    HGB 16.5 06/13/2022    HCT 48.3 06/13/2022    MCV 92.7 06/13/2022     06/13/2022          Sodium   Date Value Ref Range Status   06/13/2022 137 136 - 145 mmol/L Final     Potassium   Date Value Ref Range Status   06/13/2022 4.3 3.5 - 5.1 mmol/L Final     Chloride   Date Value Ref Range Status   06/13/2022 102 98 - 107 mmol/L Final     CO2   Date Value Ref Range Status   06/13/2022 30 21 - 32 mmol/L Final     Glucose   Date Value Ref Range Status   06/13/2022 135 (H) 74 - 106 mg/dL Final     BUN   Date Value Ref Range Status   06/13/2022 27 (H) 7 - 18 mg/dL Final     Creatinine   Date Value Ref Range Status   06/13/2022 1.08 0.70 - 1.30 mg/dL Final     Calcium    Date Value Ref Range Status   06/13/2022 9.3 8.5 - 10.1 mg/dL Final     Total Protein   Date Value Ref Range Status   06/13/2022 7.1 6.4 - 8.2 g/dL Final     Albumin   Date Value Ref Range Status   06/13/2022 4.2 3.5 - 5.0 g/dL Final     Bilirubin, Total   Date Value Ref Range Status   06/13/2022 1.0 0.0 - 1.2 mg/dL Final     Alk Phos   Date Value Ref Range Status   06/13/2022 90 45 - 115 U/L Final     AST   Date Value Ref Range Status   06/13/2022 21 15 - 37 U/L Final     ALT   Date Value Ref Range Status   06/13/2022 40 16 - 61 U/L Final     Anion Gap   Date Value Ref Range Status   06/13/2022 9 7 - 16 mmol/L Final     eGFR   Date Value Ref Range Status   06/13/2022 69 >=60 mL/min/1.73m² Final      No image results found.     Procedures   Assessment and Plan (including Health Maintenance)      Problem List  Smart Sets  Document Outside HM   :    Plan:           Problem List Items Addressed This Visit          Cardiac/Vascular    Essential hypertension, benign - Primary    Current Assessment & Plan     CBC and CMP obtained. Blood pressure well controlled at current visit. Continue current meds. Follow up in 3 months or as needed.          Relevant Medications    hydroCHLOROthiazide (HYDRODIURIL) 25 MG tablet    losartan (COZAAR) 50 MG tablet    Other Relevant Orders    Lipid Panel    Comprehensive Metabolic Panel    CBC Auto Differential       Endocrine    Hypothyroidism    Current Assessment & Plan     TSH obtained today. Continue Levothyroxine as ordered unless further instructed. Will follow up with labs.          Relevant Medications    levothyroxine (SYNTHROID) 50 MCG tablet    Other Relevant Orders    TSH     Other Visit Diagnoses       Fatigue, unspecified type        Relevant Orders    Vitamin B12 & Folate    Iron and TIBC    Abnormal finding of blood chemistry, unspecified        Relevant Orders    Iron and TIBC            Health Maintenance Topics with due status: Not Due       Topic Last Completion  Date    Lipid Panel 06/13/2022       No future appointments.     Health Maintenance Due   Topic Date Due    TETANUS VACCINE  Never done    Shingles Vaccine (1 of 2) Never done    Pneumococcal Vaccines (Age 65+) (2 - PCV) 10/10/2014        No follow-ups on file.     Signature:  Celine Avilez NP  [address] [place]  [address]    Date of encounter: 12/28/22     N/A